# Patient Record
Sex: MALE | Race: WHITE | Employment: FULL TIME
[De-identification: names, ages, dates, MRNs, and addresses within clinical notes are randomized per-mention and may not be internally consistent; named-entity substitution may affect disease eponyms.]

---

## 2017-09-12 ENCOUNTER — HOSPITAL ENCOUNTER (OUTPATIENT)
Dept: MRI IMAGING | Facility: CLINIC | Age: 57
Discharge: HOME | End: 2017-09-12
Payer: COMMERCIAL

## 2017-09-12 DIAGNOSIS — G89.29 CHRONIC MIDLINE LOW BACK PAIN WITH LEFT-SIDED SCIATICA: ICD-10-CM

## 2017-09-12 DIAGNOSIS — M54.42 CHRONIC MIDLINE LOW BACK PAIN WITH LEFT-SIDED SCIATICA: ICD-10-CM

## 2017-09-12 PROCEDURE — 72148 MRI LUMBAR SPINE W/O DYE: CPT

## 2017-09-25 ENCOUNTER — INITIAL CONSULT (OUTPATIENT)
Dept: NEUROSURGERY | Age: 57
End: 2017-09-25
Payer: COMMERCIAL

## 2017-09-25 ENCOUNTER — HOSPITAL ENCOUNTER (OUTPATIENT)
Dept: GENERAL RADIOLOGY | Facility: CLINIC | Age: 57
Discharge: HOME | End: 2017-09-25
Payer: COMMERCIAL

## 2017-09-25 ENCOUNTER — HOSPITAL ENCOUNTER (OUTPATIENT)
Facility: CLINIC | Age: 57
Discharge: HOME | End: 2017-09-25
Payer: COMMERCIAL

## 2017-09-25 VITALS
SYSTOLIC BLOOD PRESSURE: 145 MMHG | DIASTOLIC BLOOD PRESSURE: 70 MMHG | BODY MASS INDEX: 31.15 KG/M2 | HEIGHT: 72 IN | WEIGHT: 230 LBS | HEART RATE: 69 BPM

## 2017-09-25 DIAGNOSIS — G89.29 CHRONIC MIDLINE LOW BACK PAIN WITH LEFT-SIDED SCIATICA: ICD-10-CM

## 2017-09-25 DIAGNOSIS — M54.42 CHRONIC MIDLINE LOW BACK PAIN WITH LEFT-SIDED SCIATICA: ICD-10-CM

## 2017-09-25 DIAGNOSIS — G89.29 CHRONIC MIDLINE LOW BACK PAIN WITH LEFT-SIDED SCIATICA: Primary | ICD-10-CM

## 2017-09-25 DIAGNOSIS — M54.42 CHRONIC MIDLINE LOW BACK PAIN WITH LEFT-SIDED SCIATICA: Primary | ICD-10-CM

## 2017-09-25 PROCEDURE — 1036F TOBACCO NON-USER: CPT | Performed by: NEUROLOGICAL SURGERY

## 2017-09-25 PROCEDURE — 72100 X-RAY EXAM L-S SPINE 2/3 VWS: CPT

## 2017-09-25 PROCEDURE — G8428 CUR MEDS NOT DOCUMENT: HCPCS | Performed by: NEUROLOGICAL SURGERY

## 2017-09-25 PROCEDURE — 3017F COLORECTAL CA SCREEN DOC REV: CPT | Performed by: NEUROLOGICAL SURGERY

## 2017-09-25 PROCEDURE — 99243 OFF/OP CNSLTJ NEW/EST LOW 30: CPT | Performed by: NEUROLOGICAL SURGERY

## 2017-09-25 PROCEDURE — G8419 CALC BMI OUT NRM PARAM NOF/U: HCPCS | Performed by: NEUROLOGICAL SURGERY

## 2017-09-26 ASSESSMENT — VISUAL ACUITY: VA_NORMAL: 1

## 2017-10-10 ENCOUNTER — HOSPITAL ENCOUNTER (OUTPATIENT)
Dept: GENERAL RADIOLOGY | Age: 57
Discharge: HOME | End: 2017-10-10
Payer: COMMERCIAL

## 2017-10-10 ENCOUNTER — HOSPITAL ENCOUNTER (OUTPATIENT)
Dept: PREADMISSION TESTING | Age: 57
Discharge: HOME | End: 2017-10-10
Payer: COMMERCIAL

## 2017-10-10 VITALS
BODY MASS INDEX: 30.58 KG/M2 | TEMPERATURE: 97.3 F | DIASTOLIC BLOOD PRESSURE: 93 MMHG | SYSTOLIC BLOOD PRESSURE: 160 MMHG | HEIGHT: 72 IN | WEIGHT: 225.75 LBS | OXYGEN SATURATION: 98 % | HEART RATE: 68 BPM | RESPIRATION RATE: 16 BRPM

## 2017-10-10 LAB
ABSOLUTE EOS #: 0.1 K/UL (ref 0–0.4)
ABSOLUTE LYMPH #: 1.5 K/UL (ref 1–4.8)
ABSOLUTE MONO #: 0.7 K/UL (ref 0.1–1.2)
ANION GAP SERPL CALCULATED.3IONS-SCNC: 14 MMOL/L (ref 9–17)
BASOPHILS # BLD: 0 %
BASOPHILS ABSOLUTE: 0 K/UL (ref 0–0.2)
BILIRUBIN URINE: NEGATIVE
BUN BLDV-MCNC: 11 MG/DL (ref 6–20)
BUN/CREAT BLD: ABNORMAL (ref 9–20)
CALCIUM SERPL-MCNC: 9.6 MG/DL (ref 8.6–10.4)
CHLORIDE BLD-SCNC: 99 MMOL/L (ref 98–107)
CO2: 25 MMOL/L (ref 20–31)
COLOR: YELLOW
COMMENT UA: NORMAL
CREAT SERPL-MCNC: 0.73 MG/DL (ref 0.7–1.2)
DIFFERENTIAL TYPE: NORMAL
EKG ATRIAL RATE: 66 BPM
EKG P AXIS: 40 DEGREES
EKG P-R INTERVAL: 146 MS
EKG Q-T INTERVAL: 402 MS
EKG QRS DURATION: 90 MS
EKG QTC CALCULATION (BAZETT): 421 MS
EKG R AXIS: 11 DEGREES
EKG T AXIS: 17 DEGREES
EKG VENTRICULAR RATE: 66 BPM
EOSINOPHILS RELATIVE PERCENT: 1 %
GFR AFRICAN AMERICAN: >60 ML/MIN
GFR NON-AFRICAN AMERICAN: >60 ML/MIN
GFR SERPL CREATININE-BSD FRML MDRD: ABNORMAL ML/MIN/{1.73_M2}
GFR SERPL CREATININE-BSD FRML MDRD: ABNORMAL ML/MIN/{1.73_M2}
GLUCOSE BLD-MCNC: 108 MG/DL (ref 70–99)
GLUCOSE URINE: NEGATIVE
HCT VFR BLD CALC: 50.2 % (ref 41–53)
HEMOGLOBIN: 17.3 G/DL (ref 13.5–17.5)
INR BLD: 0.9
KETONES, URINE: NEGATIVE
LEUKOCYTE ESTERASE, URINE: NEGATIVE
LYMPHOCYTES # BLD: 21 %
MCH RBC QN AUTO: 31.2 PG (ref 26–34)
MCHC RBC AUTO-ENTMCNC: 34.4 G/DL (ref 31–37)
MCV RBC AUTO: 90.6 FL (ref 80–100)
MONOCYTES # BLD: 10 %
NITRITE, URINE: NEGATIVE
PARTIAL THROMBOPLASTIN TIME: 22.7 SEC (ref 21.3–31.3)
PDW BLD-RTO: 12.8 % (ref 12.5–15.4)
PH UA: 5 (ref 5–8)
PLATELET # BLD: 208 K/UL (ref 140–450)
PLATELET ESTIMATE: NORMAL
PMV BLD AUTO: 9 FL (ref 6–12)
POTASSIUM SERPL-SCNC: 5.4 MMOL/L (ref 3.7–5.3)
PROTEIN UA: NEGATIVE
PROTHROMBIN TIME: 10.1 SEC (ref 9.4–12.6)
RBC # BLD: 5.54 M/UL (ref 4.5–5.9)
RBC # BLD: NORMAL 10*6/UL
SEG NEUTROPHILS: 68 %
SEGMENTED NEUTROPHILS ABSOLUTE COUNT: 4.7 K/UL (ref 1.8–7.7)
SODIUM BLD-SCNC: 138 MMOL/L (ref 135–144)
SPECIFIC GRAVITY UA: 1.01 (ref 1–1.03)
TURBIDITY: CLEAR
URINE HGB: NEGATIVE
UROBILINOGEN, URINE: NORMAL
WBC # BLD: 7 K/UL (ref 3.5–11)
WBC # BLD: NORMAL 10*3/UL

## 2017-10-10 PROCEDURE — 71020 XR CHEST STANDARD TWO VW: CPT

## 2017-10-10 PROCEDURE — 80048 BASIC METABOLIC PNL TOTAL CA: CPT

## 2017-10-10 PROCEDURE — 85610 PROTHROMBIN TIME: CPT

## 2017-10-10 PROCEDURE — 85025 COMPLETE CBC W/AUTO DIFF WBC: CPT

## 2017-10-10 PROCEDURE — 93005 ELECTROCARDIOGRAM TRACING: CPT

## 2017-10-10 PROCEDURE — 85730 THROMBOPLASTIN TIME PARTIAL: CPT

## 2017-10-10 PROCEDURE — 81003 URINALYSIS AUTO W/O SCOPE: CPT

## 2017-10-10 PROCEDURE — 36415 COLL VENOUS BLD VENIPUNCTURE: CPT

## 2017-10-10 RX ORDER — SODIUM CHLORIDE, SODIUM LACTATE, POTASSIUM CHLORIDE, CALCIUM CHLORIDE 600; 310; 30; 20 MG/100ML; MG/100ML; MG/100ML; MG/100ML
1000 INJECTION, SOLUTION INTRAVENOUS CONTINUOUS
Status: CANCELLED | OUTPATIENT
Start: 2017-10-10

## 2017-10-10 NOTE — H&P
History and Physical    Pt Name: Srinivasan Sequeira  MRN: [id number]  YOB: 1960  Date of evaluation: 10/10/2017    SUBJECTIVE:   History of Chief Complaint:    Patient complains of back pain, LLE pain/weakness. He says that he has had symptoms for approximately 1 year now, but that it has gotten progressively worse. He had chiropractor treatment, PT. He has been found to have lumbar disc disease, scheduled for lumbar laminectomy. Past Medical History    has a past medical history of Lumbar disc disease and Snores. Past Surgical History   has a past surgical history that includes Rotator cuff repair (Right). Medications  Aleve, tramadol  Allergies  has No Known Allergies. Family History  family history includes Dementia in his mother; Heart Failure in his father. Social History   reports that he has quit smoking. He has never used smokeless tobacco.   reports that he does not drink alcohol. reports that he does not use drugs. Marital Status   Occupation [employer] [employer] employee    OBJECTIVE:   VITALS:  height is 6' (1.829 m) and weight is 225 lb 12 oz (102.4 kg). His oral temperature is 97.3 °F (36.3 °C). His blood pressure is 160/93 (abnormal) and his pulse is 68. His respiration is 16 and oxygen saturation is 98%. CONSTITUTIONAL:alert & oriented x 3, no acute distress   SKIN:  Warm and dry, no rashes   HEAD:  Normocephalic, atraumatic   EYES: PERRL. EOMs intact. EARS:  Hearing grossly WNL. NOSE:  Nares patent. No rhinorrhea   MOUTH/THROAT:  benign  NECK:supple, no lymphadenopathy  LUNGS: Clear to auscultation bilaterally, no wheezes, rales, or rhonchi. CARDIOVASCULAR: Heart sounds are normal.  Regular rate and rhythm without murmur, gallop or rub. ABDOMEN: soft, non tender, non distended, no masses or organomegaly   EXTREMITIES: no edema bilateral lower extremities     Testing:   EKG: 10/10/17  Labs pending: drawn 10/10/2017   Chest XRay:  10/10/17    IMPRESSIONS:   1.  Lumbar

## 2017-10-24 ENCOUNTER — HOSPITAL ENCOUNTER (INPATIENT)
Age: 57
LOS: 4 days | Discharge: HOME | DRG: 520 | End: 2017-10-28
Attending: NEUROLOGICAL SURGERY | Admitting: NEUROLOGICAL SURGERY
Payer: COMMERCIAL

## 2017-10-24 ENCOUNTER — ANESTHESIA EVENT (OUTPATIENT)
Dept: OPERATING ROOM | Age: 57
DRG: 520 | End: 2017-10-24
Payer: COMMERCIAL

## 2017-10-24 ENCOUNTER — ANESTHESIA (OUTPATIENT)
Dept: OPERATING ROOM | Age: 57
DRG: 520 | End: 2017-10-24
Payer: COMMERCIAL

## 2017-10-24 ENCOUNTER — APPOINTMENT (OUTPATIENT)
Dept: GENERAL RADIOLOGY | Age: 57
DRG: 520 | End: 2017-10-24
Attending: NEUROLOGICAL SURGERY
Payer: COMMERCIAL

## 2017-10-24 VITALS — SYSTOLIC BLOOD PRESSURE: 107 MMHG | TEMPERATURE: 98.2 F | DIASTOLIC BLOOD PRESSURE: 65 MMHG | OXYGEN SATURATION: 98 %

## 2017-10-24 LAB
ALLEN TEST: ABNORMAL
CALCIUM IONIZED: 1.19 MMOL/L (ref 1.13–1.33)
CARBOXYHEMOGLOBIN: 1.3 % (ref 0–5)
CHLORIDE, WHOLE BLOOD: 108 MMOL/L (ref 98–110)
FIO2: 50
GLUCOSE BLD-MCNC: 153 MG/DL (ref 75–110)
HCO3 ARTERIAL: 23.8 MMOL/L (ref 22–27)
HCT VFR BLD CALC: 41.4 %
HEMOGLOBIN: 13.5 GM/DL
METHEMOGLOBIN: ABNORMAL % (ref 0–1.5)
MODE: ABNORMAL
NEGATIVE BASE EXCESS, ART: 0.4 MMOL/L (ref 0–2)
NOTIFICATION TIME: ABNORMAL
NOTIFICATION: ABNORMAL
O2 DEVICE/FLOW/%: ABNORMAL
O2 SAT, ARTERIAL: 100 % (ref 94–100)
OXYHEMOGLOBIN: ABNORMAL % (ref 95–98)
PATIENT TEMP: 37
PCO2 ARTERIAL: 39.9 MMHG (ref 32–45)
PCO2, ART, TEMP ADJ: ABNORMAL (ref 32–45)
PEEP/CPAP: ABNORMAL
PH ARTERIAL: 7.39 (ref 7.35–7.45)
PH, ART, TEMP ADJ: ABNORMAL (ref 7.35–7.45)
PO2 ARTERIAL: 232 MMHG (ref 75–95)
PO2, ART, TEMP ADJ: ABNORMAL MMHG (ref 75–95)
POC POTASSIUM: 4 MMOL/L (ref 3.5–4.5)
POSITIVE BASE EXCESS, ART: ABNORMAL MMOL/L (ref 0–2)
POTASSIUM, WHOLE BLOOD: 4.2 MMOL/L (ref 3.6–5)
PSV: ABNORMAL
PT. POSITION: ABNORMAL
RESPIRATORY RATE: ABNORMAL
SAMPLE SITE: ABNORMAL
SET RATE: ABNORMAL
SODIUM, WHOLE BLOOD: 137 MMOL/L (ref 136–145)
TEXT FOR RESPIRATORY: ABNORMAL
TOTAL HB: ABNORMAL G/DL (ref 12–16)
TOTAL RATE: ABNORMAL
VT: ABNORMAL

## 2017-10-24 PROCEDURE — 82805 BLOOD GASES W/O2 SATURATION: CPT

## 2017-10-24 PROCEDURE — 6360000002 HC RX W HCPCS

## 2017-10-24 PROCEDURE — 2580000003 HC RX 258: Performed by: NEUROLOGICAL SURGERY

## 2017-10-24 PROCEDURE — 85018 HEMOGLOBIN: CPT

## 2017-10-24 PROCEDURE — 6360000002 HC RX W HCPCS: Performed by: NEUROLOGICAL SURGERY

## 2017-10-24 PROCEDURE — 2500000003 HC RX 250 WO HCPCS: Performed by: NEUROLOGICAL SURGERY

## 2017-10-24 PROCEDURE — 2580000003 HC RX 258: Performed by: ANESTHESIOLOGY

## 2017-10-24 PROCEDURE — 6370000000 HC RX 637 (ALT 250 FOR IP): Performed by: NEUROLOGICAL SURGERY

## 2017-10-24 PROCEDURE — 82330 ASSAY OF CALCIUM: CPT

## 2017-10-24 PROCEDURE — 85014 HEMATOCRIT: CPT

## 2017-10-24 PROCEDURE — 2060000000 HC ICU INTERMEDIATE R&B

## 2017-10-24 PROCEDURE — 84132 ASSAY OF SERUM POTASSIUM: CPT

## 2017-10-24 PROCEDURE — 3700000001 HC ADD 15 MINUTES (ANESTHESIA): Performed by: NEUROLOGICAL SURGERY

## 2017-10-24 PROCEDURE — A6402 STERILE GAUZE <= 16 SQ IN: HCPCS | Performed by: NEUROLOGICAL SURGERY

## 2017-10-24 PROCEDURE — 7100000000 HC PACU RECOVERY - FIRST 15 MIN: Performed by: NEUROLOGICAL SURGERY

## 2017-10-24 PROCEDURE — 7100000001 HC PACU RECOVERY - ADDTL 15 MIN: Performed by: NEUROLOGICAL SURGERY

## 2017-10-24 PROCEDURE — 00NY0ZZ RELEASE LUMBAR SPINAL CORD, OPEN APPROACH: ICD-10-PCS | Performed by: NEUROLOGICAL SURGERY

## 2017-10-24 PROCEDURE — 3600000014 HC SURGERY LEVEL 4 ADDTL 15MIN: Performed by: NEUROLOGICAL SURGERY

## 2017-10-24 PROCEDURE — C1729 CATH, DRAINAGE: HCPCS | Performed by: NEUROLOGICAL SURGERY

## 2017-10-24 PROCEDURE — 6360000002 HC RX W HCPCS: Performed by: ANESTHESIOLOGY

## 2017-10-24 PROCEDURE — 3600000004 HC SURGERY LEVEL 4 BASE: Performed by: NEUROLOGICAL SURGERY

## 2017-10-24 PROCEDURE — 87086 URINE CULTURE/COLONY COUNT: CPT

## 2017-10-24 PROCEDURE — 2500000003 HC RX 250 WO HCPCS: Performed by: SPECIALIST

## 2017-10-24 PROCEDURE — 63047 LAM FACETEC & FORAMOT LUMBAR: CPT | Performed by: NEUROLOGICAL SURGERY

## 2017-10-24 PROCEDURE — 2580000003 HC RX 258: Performed by: EMERGENCY MEDICINE

## 2017-10-24 PROCEDURE — 72020 X-RAY EXAM OF SPINE 1 VIEW: CPT

## 2017-10-24 PROCEDURE — 63048 LAM FACETEC &FORAMOT EA ADDL: CPT | Performed by: NEUROLOGICAL SURGERY

## 2017-10-24 PROCEDURE — 3700000000 HC ANESTHESIA ATTENDED CARE: Performed by: NEUROLOGICAL SURGERY

## 2017-10-24 PROCEDURE — 01NB0ZZ RELEASE LUMBAR NERVE, OPEN APPROACH: ICD-10-PCS | Performed by: NEUROLOGICAL SURGERY

## 2017-10-24 PROCEDURE — 2580000003 HC RX 258: Performed by: SPECIALIST

## 2017-10-24 PROCEDURE — 6360000002 HC RX W HCPCS: Performed by: SPECIALIST

## 2017-10-24 PROCEDURE — 2720000010 HC SURG SUPPLY STERILE: Performed by: NEUROLOGICAL SURGERY

## 2017-10-24 RX ORDER — SODIUM CHLORIDE 9 MG/ML
INJECTION, SOLUTION INTRAVENOUS CONTINUOUS
Status: DISCONTINUED | OUTPATIENT
Start: 2017-10-24 | End: 2017-10-26

## 2017-10-24 RX ORDER — HYDRALAZINE HYDROCHLORIDE 20 MG/ML
10 INJECTION INTRAMUSCULAR; INTRAVENOUS
Status: DISCONTINUED | OUTPATIENT
Start: 2017-10-24 | End: 2017-10-24

## 2017-10-24 RX ORDER — GINSENG 100 MG
CAPSULE ORAL PRN
Status: DISCONTINUED | OUTPATIENT
Start: 2017-10-24 | End: 2017-10-24

## 2017-10-24 RX ORDER — LORAZEPAM 2 MG/ML
4 INJECTION INTRAMUSCULAR
Status: DISCONTINUED | OUTPATIENT
Start: 2017-10-24 | End: 2017-10-28 | Stop reason: HOSPADM

## 2017-10-24 RX ORDER — SODIUM CHLORIDE 0.9 % (FLUSH) 0.9 %
10 SYRINGE (ML) INJECTION EVERY 12 HOURS SCHEDULED
Status: DISCONTINUED | OUTPATIENT
Start: 2017-10-24 | End: 2017-10-28 | Stop reason: HOSPADM

## 2017-10-24 RX ORDER — NALOXONE HYDROCHLORIDE 0.4 MG/ML
INJECTION, SOLUTION INTRAMUSCULAR; INTRAVENOUS; SUBCUTANEOUS PRN
Status: DISCONTINUED | OUTPATIENT
Start: 2017-10-24 | End: 2017-10-24 | Stop reason: SDUPTHER

## 2017-10-24 RX ORDER — HYDRALAZINE HYDROCHLORIDE 20 MG/ML
10 INJECTION INTRAMUSCULAR; INTRAVENOUS EVERY 6 HOURS PRN
Status: DISCONTINUED | OUTPATIENT
Start: 2017-10-24 | End: 2017-10-28 | Stop reason: HOSPADM

## 2017-10-24 RX ORDER — ROCURONIUM BROMIDE 10 MG/ML
INJECTION, SOLUTION INTRAVENOUS PRN
Status: DISCONTINUED | OUTPATIENT
Start: 2017-10-24 | End: 2017-10-24 | Stop reason: SDUPTHER

## 2017-10-24 RX ORDER — LORAZEPAM 2 MG/1
4 TABLET ORAL
Status: DISCONTINUED | OUTPATIENT
Start: 2017-10-24 | End: 2017-10-28 | Stop reason: HOSPADM

## 2017-10-24 RX ORDER — LORAZEPAM 2 MG/ML
2 INJECTION INTRAMUSCULAR
Status: DISCONTINUED | OUTPATIENT
Start: 2017-10-24 | End: 2017-10-28 | Stop reason: HOSPADM

## 2017-10-24 RX ORDER — SODIUM CHLORIDE, SODIUM LACTATE, POTASSIUM CHLORIDE, CALCIUM CHLORIDE 600; 310; 30; 20 MG/100ML; MG/100ML; MG/100ML; MG/100ML
INJECTION, SOLUTION INTRAVENOUS CONTINUOUS PRN
Status: DISCONTINUED | OUTPATIENT
Start: 2017-10-24 | End: 2017-10-24 | Stop reason: SDUPTHER

## 2017-10-24 RX ORDER — LORAZEPAM 2 MG/1
2 TABLET ORAL
Status: DISCONTINUED | OUTPATIENT
Start: 2017-10-24 | End: 2017-10-28 | Stop reason: HOSPADM

## 2017-10-24 RX ORDER — LORAZEPAM 2 MG/ML
1 INJECTION INTRAMUSCULAR
Status: DISCONTINUED | OUTPATIENT
Start: 2017-10-24 | End: 2017-10-28 | Stop reason: HOSPADM

## 2017-10-24 RX ORDER — CYCLOBENZAPRINE HCL 10 MG
10 TABLET ORAL 3 TIMES DAILY
Status: DISCONTINUED | OUTPATIENT
Start: 2017-10-24 | End: 2017-10-28 | Stop reason: HOSPADM

## 2017-10-24 RX ORDER — MIDAZOLAM HYDROCHLORIDE 1 MG/ML
INJECTION INTRAMUSCULAR; INTRAVENOUS PRN
Status: DISCONTINUED | OUTPATIENT
Start: 2017-10-24 | End: 2017-10-24 | Stop reason: SDUPTHER

## 2017-10-24 RX ORDER — OXYCODONE HYDROCHLORIDE AND ACETAMINOPHEN 5; 325 MG/1; MG/1
2 TABLET ORAL EVERY 4 HOURS PRN
Status: DISCONTINUED | OUTPATIENT
Start: 2017-10-24 | End: 2017-10-28 | Stop reason: HOSPADM

## 2017-10-24 RX ORDER — SODIUM CHLORIDE 0.9 % (FLUSH) 0.9 %
10 SYRINGE (ML) INJECTION PRN
Status: DISCONTINUED | OUTPATIENT
Start: 2017-10-24 | End: 2017-10-28 | Stop reason: HOSPADM

## 2017-10-24 RX ORDER — LORAZEPAM 1 MG/1
1 TABLET ORAL
Status: DISCONTINUED | OUTPATIENT
Start: 2017-10-24 | End: 2017-10-28 | Stop reason: HOSPADM

## 2017-10-24 RX ORDER — OXYCODONE HYDROCHLORIDE AND ACETAMINOPHEN 5; 325 MG/1; MG/1
1 TABLET ORAL EVERY 4 HOURS PRN
Status: DISCONTINUED | OUTPATIENT
Start: 2017-10-24 | End: 2017-10-28 | Stop reason: HOSPADM

## 2017-10-24 RX ORDER — SODIUM CHLORIDE, SODIUM LACTATE, POTASSIUM CHLORIDE, CALCIUM CHLORIDE 600; 310; 30; 20 MG/100ML; MG/100ML; MG/100ML; MG/100ML
1000 INJECTION, SOLUTION INTRAVENOUS CONTINUOUS
Status: DISCONTINUED | OUTPATIENT
Start: 2017-10-24 | End: 2017-10-24

## 2017-10-24 RX ORDER — PROPOFOL 10 MG/ML
INJECTION, EMULSION INTRAVENOUS CONTINUOUS PRN
Status: DISCONTINUED | OUTPATIENT
Start: 2017-10-24 | End: 2017-10-24 | Stop reason: SDUPTHER

## 2017-10-24 RX ORDER — DOCUSATE SODIUM 100 MG/1
100 CAPSULE, LIQUID FILLED ORAL 2 TIMES DAILY
Status: DISCONTINUED | OUTPATIENT
Start: 2017-10-24 | End: 2017-10-28 | Stop reason: HOSPADM

## 2017-10-24 RX ORDER — WOUND DRESSING ADHESIVE - LIQUID
LIQUID MISCELLANEOUS PRN
Status: DISCONTINUED | OUTPATIENT
Start: 2017-10-24 | End: 2017-10-24

## 2017-10-24 RX ORDER — GLYCOPYRROLATE 0.2 MG/ML
INJECTION INTRAMUSCULAR; INTRAVENOUS PRN
Status: DISCONTINUED | OUTPATIENT
Start: 2017-10-24 | End: 2017-10-24 | Stop reason: SDUPTHER

## 2017-10-24 RX ORDER — PROPOFOL 10 MG/ML
INJECTION, EMULSION INTRAVENOUS PRN
Status: DISCONTINUED | OUTPATIENT
Start: 2017-10-24 | End: 2017-10-24 | Stop reason: SDUPTHER

## 2017-10-24 RX ORDER — ONDANSETRON 2 MG/ML
4 INJECTION INTRAMUSCULAR; INTRAVENOUS EVERY 6 HOURS PRN
Status: DISCONTINUED | OUTPATIENT
Start: 2017-10-24 | End: 2017-10-28 | Stop reason: HOSPADM

## 2017-10-24 RX ORDER — MIDAZOLAM HYDROCHLORIDE 1 MG/ML
1 INJECTION INTRAMUSCULAR; INTRAVENOUS EVERY 10 MIN PRN
Status: COMPLETED | OUTPATIENT
Start: 2017-10-24 | End: 2017-10-24

## 2017-10-24 RX ORDER — MAGNESIUM HYDROXIDE 1200 MG/15ML
LIQUID ORAL CONTINUOUS PRN
Status: DISCONTINUED | OUTPATIENT
Start: 2017-10-24 | End: 2017-10-24

## 2017-10-24 RX ORDER — ONDANSETRON 2 MG/ML
INJECTION INTRAMUSCULAR; INTRAVENOUS PRN
Status: DISCONTINUED | OUTPATIENT
Start: 2017-10-24 | End: 2017-10-24 | Stop reason: SDUPTHER

## 2017-10-24 RX ORDER — ACETAMINOPHEN 325 MG/1
650 TABLET ORAL EVERY 4 HOURS PRN
Status: DISCONTINUED | OUTPATIENT
Start: 2017-10-24 | End: 2017-10-28 | Stop reason: HOSPADM

## 2017-10-24 RX ORDER — LORAZEPAM 2 MG/ML
3 INJECTION INTRAMUSCULAR
Status: DISCONTINUED | OUTPATIENT
Start: 2017-10-24 | End: 2017-10-28 | Stop reason: HOSPADM

## 2017-10-24 RX ORDER — LIDOCAINE HYDROCHLORIDE 10 MG/ML
INJECTION, SOLUTION EPIDURAL; INFILTRATION; INTRACAUDAL; PERINEURAL PRN
Status: DISCONTINUED | OUTPATIENT
Start: 2017-10-24 | End: 2017-10-24 | Stop reason: SDUPTHER

## 2017-10-24 RX ORDER — DIPHENHYDRAMINE HYDROCHLORIDE 50 MG/ML
12.5 INJECTION INTRAMUSCULAR; INTRAVENOUS
Status: DISCONTINUED | OUTPATIENT
Start: 2017-10-24 | End: 2017-10-24

## 2017-10-24 RX ORDER — FENTANYL CITRATE 50 UG/ML
INJECTION, SOLUTION INTRAMUSCULAR; INTRAVENOUS PRN
Status: DISCONTINUED | OUTPATIENT
Start: 2017-10-24 | End: 2017-10-24 | Stop reason: SDUPTHER

## 2017-10-24 RX ORDER — FAMOTIDINE 20 MG/1
20 TABLET, FILM COATED ORAL 2 TIMES DAILY
Status: DISCONTINUED | OUTPATIENT
Start: 2017-10-24 | End: 2017-10-28 | Stop reason: HOSPADM

## 2017-10-24 RX ADMIN — SODIUM CHLORIDE, POTASSIUM CHLORIDE, SODIUM LACTATE AND CALCIUM CHLORIDE 1000 ML: 600; 310; 30; 20 INJECTION, SOLUTION INTRAVENOUS at 06:45

## 2017-10-24 RX ADMIN — PHENYLEPHRINE HYDROCHLORIDE 20 MCG/MIN: 10 INJECTION INTRAMUSCULAR; INTRAVENOUS; SUBCUTANEOUS at 09:05

## 2017-10-24 RX ADMIN — HYDROMORPHONE HYDROCHLORIDE 0.5 MG: 1 INJECTION, SOLUTION INTRAMUSCULAR; INTRAVENOUS; SUBCUTANEOUS at 13:45

## 2017-10-24 RX ADMIN — NEOSTIGMINE METHYLSULFATE 3 MG: 1 INJECTION, SOLUTION INTRAMUSCULAR; INTRAVENOUS; SUBCUTANEOUS at 12:48

## 2017-10-24 RX ADMIN — Medication 10 ML: at 22:07

## 2017-10-24 RX ADMIN — MIDAZOLAM HYDROCHLORIDE 1 MG: 1 INJECTION, SOLUTION INTRAMUSCULAR; INTRAVENOUS at 13:50

## 2017-10-24 RX ADMIN — HYDROMORPHONE HYDROCHLORIDE 0.5 MG: 1 INJECTION, SOLUTION INTRAMUSCULAR; INTRAVENOUS; SUBCUTANEOUS at 14:55

## 2017-10-24 RX ADMIN — PROPOFOL 200 MG: 10 INJECTION, EMULSION INTRAVENOUS at 07:35

## 2017-10-24 RX ADMIN — HYDROMORPHONE HYDROCHLORIDE 0.25 MG: 1 INJECTION, SOLUTION INTRAMUSCULAR; INTRAVENOUS; SUBCUTANEOUS at 14:45

## 2017-10-24 RX ADMIN — FAMOTIDINE 20 MG: 20 TABLET, FILM COATED ORAL at 20:56

## 2017-10-24 RX ADMIN — MIDAZOLAM HYDROCHLORIDE 1 MG: 1 INJECTION, SOLUTION INTRAMUSCULAR; INTRAVENOUS at 15:12

## 2017-10-24 RX ADMIN — CYCLOBENZAPRINE 10 MG: 10 TABLET, FILM COATED ORAL at 17:04

## 2017-10-24 RX ADMIN — MIDAZOLAM HYDROCHLORIDE 2 MG: 1 INJECTION, SOLUTION INTRAMUSCULAR; INTRAVENOUS at 07:31

## 2017-10-24 RX ADMIN — CYCLOBENZAPRINE 10 MG: 10 TABLET, FILM COATED ORAL at 20:56

## 2017-10-24 RX ADMIN — PROPOFOL 50 MCG/KG/MIN: 10 INJECTION, EMULSION INTRAVENOUS at 08:02

## 2017-10-24 RX ADMIN — HYDROMORPHONE HYDROCHLORIDE 0.5 MG: 1 INJECTION, SOLUTION INTRAMUSCULAR; INTRAVENOUS; SUBCUTANEOUS at 16:10

## 2017-10-24 RX ADMIN — LIDOCAINE HYDROCHLORIDE 50 MG: 10 INJECTION, SOLUTION EPIDURAL; INFILTRATION; INTRACAUDAL; PERINEURAL at 07:35

## 2017-10-24 RX ADMIN — NALOXONE HYDROCHLORIDE 0.08 MG: 0.4 INJECTION, SOLUTION INTRAMUSCULAR; INTRAVENOUS; SUBCUTANEOUS at 12:44

## 2017-10-24 RX ADMIN — SUFENTANIL CITRATE 0.5 MCG/KG/HR: 50 INJECTION EPIDURAL; INTRAVENOUS at 08:00

## 2017-10-24 RX ADMIN — PHENYLEPHRINE HYDROCHLORIDE 100 MCG: 10 INJECTION INTRAMUSCULAR; INTRAVENOUS; SUBCUTANEOUS at 10:25

## 2017-10-24 RX ADMIN — HYDROMORPHONE HYDROCHLORIDE 0.25 MG: 1 INJECTION, SOLUTION INTRAMUSCULAR; INTRAVENOUS; SUBCUTANEOUS at 14:50

## 2017-10-24 RX ADMIN — SODIUM CHLORIDE: 9 INJECTION, SOLUTION INTRAVENOUS at 16:29

## 2017-10-24 RX ADMIN — GLYCOPYRROLATE 0.4 MG: 0.2 INJECTION INTRAMUSCULAR; INTRAVENOUS at 12:48

## 2017-10-24 RX ADMIN — OXYCODONE HYDROCHLORIDE AND ACETAMINOPHEN 2 TABLET: 5; 325 TABLET ORAL at 19:08

## 2017-10-24 RX ADMIN — SODIUM CHLORIDE, POTASSIUM CHLORIDE, SODIUM LACTATE AND CALCIUM CHLORIDE: 600; 310; 30; 20 INJECTION, SOLUTION INTRAVENOUS at 07:30

## 2017-10-24 RX ADMIN — FENTANYL CITRATE 100 MCG: 50 INJECTION, SOLUTION INTRAMUSCULAR; INTRAVENOUS at 07:35

## 2017-10-24 RX ADMIN — CEFAZOLIN 2 G: 1 INJECTION, POWDER, FOR SOLUTION INTRAMUSCULAR; INTRAVENOUS at 17:01

## 2017-10-24 RX ADMIN — HYDROMORPHONE HYDROCHLORIDE 0.5 MG: 1 INJECTION, SOLUTION INTRAMUSCULAR; INTRAVENOUS; SUBCUTANEOUS at 20:11

## 2017-10-24 RX ADMIN — ROCURONIUM BROMIDE 50 MG: 10 INJECTION, SOLUTION INTRAVENOUS at 07:35

## 2017-10-24 RX ADMIN — DOCUSATE SODIUM 100 MG: 100 CAPSULE ORAL at 20:56

## 2017-10-24 RX ADMIN — ONDANSETRON 4 MG: 2 INJECTION, SOLUTION INTRAMUSCULAR; INTRAVENOUS at 11:54

## 2017-10-24 ASSESSMENT — PAIN DESCRIPTION - PAIN TYPE
TYPE: SURGICAL PAIN

## 2017-10-24 ASSESSMENT — PAIN DESCRIPTION - PROGRESSION: CLINICAL_PROGRESSION: NOT CHANGED

## 2017-10-24 ASSESSMENT — PAIN SCALES - GENERAL
PAINLEVEL_OUTOF10: 7
PAINLEVEL_OUTOF10: 8
PAINLEVEL_OUTOF10: 4
PAINLEVEL_OUTOF10: 9
PAINLEVEL_OUTOF10: 3
PAINLEVEL_OUTOF10: 6
PAINLEVEL_OUTOF10: 7
PAINLEVEL_OUTOF10: 3
PAINLEVEL_OUTOF10: 3
PAINLEVEL_OUTOF10: 6
PAINLEVEL_OUTOF10: 7

## 2017-10-24 ASSESSMENT — PAIN DESCRIPTION - FREQUENCY: FREQUENCY: CONTINUOUS

## 2017-10-24 ASSESSMENT — PAIN - FUNCTIONAL ASSESSMENT: PAIN_FUNCTIONAL_ASSESSMENT: 0-10

## 2017-10-24 ASSESSMENT — PAIN DESCRIPTION - ORIENTATION: ORIENTATION: RIGHT;LEFT

## 2017-10-24 ASSESSMENT — PAIN DESCRIPTION - DESCRIPTORS: DESCRIPTORS: ACHING;DISCOMFORT

## 2017-10-24 ASSESSMENT — PAIN DESCRIPTION - ONSET: ONSET: ON-GOING

## 2017-10-24 ASSESSMENT — PAIN DESCRIPTION - LOCATION
LOCATION: BACK
LOCATION: BACK

## 2017-10-24 ASSESSMENT — LIFESTYLE VARIABLES: SMOKING_STATUS: 0

## 2017-10-24 NOTE — H&P (VIEW-ONLY)
History and Physical    Pt Name: Lesley Ledezma  MRN: [id number]  YOB: 1960  Date of evaluation: 10/10/2017    SUBJECTIVE:   History of Chief Complaint:    Patient complains of back pain, LLE pain/weakness. He says that he has had symptoms for approximately 1 year now, but that it has gotten progressively worse. He had chiropractor treatment, PT. He has been found to have lumbar disc disease, scheduled for lumbar laminectomy. Past Medical History    has a past medical history of Lumbar disc disease and Snores. Past Surgical History   has a past surgical history that includes Rotator cuff repair (Right). Medications  Aleve, tramadol  Allergies  has No Known Allergies. Family History  family history includes Dementia in his mother; Heart Failure in his father. Social History   reports that he has quit smoking. He has never used smokeless tobacco.   reports that he does not drink alcohol. reports that he does not use drugs. Marital Status   Occupation [employer] employee    OBJECTIVE:   VITALS:  height is 6' (1.829 m) and weight is 225 lb 12 oz (102.4 kg). His oral temperature is 97.3 °F (36.3 °C). His blood pressure is 160/93 (abnormal) and his pulse is 68. His respiration is 16 and oxygen saturation is 98%. CONSTITUTIONAL:alert & oriented x 3, no acute distress   SKIN:  Warm and dry, no rashes   HEAD:  Normocephalic, atraumatic   EYES: PERRL. EOMs intact. EARS:  Hearing grossly WNL. NOSE:  Nares patent. No rhinorrhea   MOUTH/THROAT:  benign  NECK:supple, no lymphadenopathy  LUNGS: Clear to auscultation bilaterally, no wheezes, rales, or rhonchi. CARDIOVASCULAR: Heart sounds are normal.  Regular rate and rhythm without murmur, gallop or rub. ABDOMEN: soft, non tender, non distended, no masses or organomegaly   EXTREMITIES: no edema bilateral lower extremities     Testing:   EKG: 10/10/17  Labs pending: drawn 10/10/2017   Chest XRay:  10/10/17    IMPRESSIONS:   1.  Lumbar disc disease  2.  has a past medical history of Lumbar disc disease and Snores. PLANS:   1.  Lumbar laminectomy    REG VALDES PA-C  Electronically signed 10/10/2017 at 12:27 PM

## 2017-10-24 NOTE — INTERVAL H&P NOTE
Pt Name: Susan Navas  MRN: [id number]  YOB: 1960  Date of evaluation: 10/24/2017    I have reviewed the patient's history and physical examination completed in pre-admission testing on 10/10/17    No changes to history or on examination today, unless noted below.    none    YANETH WHITE CNP  10/24/17  6:31 AM

## 2017-10-24 NOTE — PROGRESS NOTES
Neurosurgery Post-op Note      POD#0, s/p Decompressive laminectomy, medial facetectomy, and foraminotomy at L2-3, L3-4, L4-5. Placement of subfascial Hemovac drain. SUBJECTIVE:  Patient admits to appropriate postop pain, trouble swallowing as expected s/p extubation. Talking, no shortness of breath. Hydralazine ordered for elevated BP. Awaiting pain medication. OBJECTIVE      Physical exam   VITALS:    Vitals:    10/24/17 0628   BP: (!) 170/91   Pulse: 65   Resp: 18   Temp: 96.8 °F (36 °C)   SpO2: 99%     INTAKE:    Intake/Output Summary (Last 24 hours) at 10/24/17 1312  Last data filed at 10/24/17 1250   Gross per 24 hour   Intake             3200 ml   Output             1260 ml   Net             1940 ml       Neurological exam    AO3  EOMI PERRLA  Motor and sensory intact throughout  Follows commands x4    Wound   Post op wound:  posterior lumbar spine   clean, dry and no drainage   Wound closed with staples  Dressing intact. Hemovac      ASSESSMENT AND PLAN    POD#0, s/p Decompressive laminectomy, medial facetectomy, and foraminotomy at L2-3, L3-4, L4-5. Placement of subfascial Hemovac drain.     - Transfer to neuro floor   - Neuro checks per protocol   - No anticoagulants/antiplatelets/NSAIDs   - SCDs for DVT ppx   - Clear diet, adv as tolerated   - Dangle tonight, ambulate w PT/OT in am   - HOB 30 degrees, encourage IS   - Dilaudid, percocet, flexeril   - Ancef 1g q8h x3 doses   - Monitor drain output   - CIWA protocol as needed for DTs   - Basic labs in am   - Additional recommendations may follow. Please contact neurosurgery with any changes in patients neurologic status.        Electronically signed by Delia Zhao PA-C on 10/24/2017 at 1:12 PM

## 2017-10-25 LAB
ABSOLUTE EOS #: 0 K/UL (ref 0–0.4)
ABSOLUTE IMMATURE GRANULOCYTE: ABNORMAL K/UL (ref 0–0.3)
ABSOLUTE LYMPH #: 0.9 K/UL (ref 1–4.8)
ABSOLUTE MONO #: 0.9 K/UL (ref 0.1–1.2)
ANION GAP SERPL CALCULATED.3IONS-SCNC: 11 MMOL/L (ref 9–17)
BASOPHILS # BLD: 0 %
BASOPHILS ABSOLUTE: 0 K/UL (ref 0–0.2)
BUN BLDV-MCNC: 9 MG/DL (ref 6–20)
BUN/CREAT BLD: ABNORMAL (ref 9–20)
CALCIUM SERPL-MCNC: 7.8 MG/DL (ref 8.6–10.4)
CHLORIDE BLD-SCNC: 102 MMOL/L (ref 98–107)
CO2: 23 MMOL/L (ref 20–31)
CREAT SERPL-MCNC: 0.71 MG/DL (ref 0.7–1.2)
CULTURE: NO GROWTH
CULTURE: NORMAL
DIFFERENTIAL TYPE: ABNORMAL
EOSINOPHILS RELATIVE PERCENT: 0 %
GFR AFRICAN AMERICAN: >60 ML/MIN
GFR NON-AFRICAN AMERICAN: >60 ML/MIN
GFR SERPL CREATININE-BSD FRML MDRD: ABNORMAL ML/MIN/{1.73_M2}
GFR SERPL CREATININE-BSD FRML MDRD: ABNORMAL ML/MIN/{1.73_M2}
GLUCOSE BLD-MCNC: 127 MG/DL (ref 70–99)
HCT VFR BLD CALC: 34 % (ref 41–53)
HEMOGLOBIN: 11.6 G/DL (ref 13.5–17.5)
IMMATURE GRANULOCYTES: ABNORMAL %
LYMPHOCYTES # BLD: 12 %
Lab: NORMAL
MCH RBC QN AUTO: 31.9 PG (ref 26–34)
MCHC RBC AUTO-ENTMCNC: 34.2 G/DL (ref 31–37)
MCV RBC AUTO: 93.1 FL (ref 80–100)
MONOCYTES # BLD: 12 %
PDW BLD-RTO: 13.4 % (ref 12.5–15.4)
PLATELET # BLD: 131 K/UL (ref 140–450)
PLATELET ESTIMATE: ABNORMAL
PMV BLD AUTO: 8.8 FL (ref 6–12)
POTASSIUM SERPL-SCNC: 4 MMOL/L (ref 3.7–5.3)
RBC # BLD: 3.65 M/UL (ref 4.5–5.9)
RBC # BLD: ABNORMAL 10*6/UL
SEG NEUTROPHILS: 76 %
SEGMENTED NEUTROPHILS ABSOLUTE COUNT: 5.7 K/UL (ref 1.8–7.7)
SODIUM BLD-SCNC: 136 MMOL/L (ref 135–144)
SPECIMEN DESCRIPTION: NORMAL
STATUS: NORMAL
WBC # BLD: 7.5 K/UL (ref 3.5–11)
WBC # BLD: ABNORMAL 10*3/UL

## 2017-10-25 PROCEDURE — 97166 OT EVAL MOD COMPLEX 45 MIN: CPT

## 2017-10-25 PROCEDURE — 6360000002 HC RX W HCPCS: Performed by: NEUROLOGICAL SURGERY

## 2017-10-25 PROCEDURE — 97530 THERAPEUTIC ACTIVITIES: CPT

## 2017-10-25 PROCEDURE — G8988 SELF CARE GOAL STATUS: HCPCS

## 2017-10-25 PROCEDURE — 6370000000 HC RX 637 (ALT 250 FOR IP): Performed by: NEUROLOGICAL SURGERY

## 2017-10-25 PROCEDURE — 2580000003 HC RX 258: Performed by: EMERGENCY MEDICINE

## 2017-10-25 PROCEDURE — 6370000000 HC RX 637 (ALT 250 FOR IP): Performed by: PHYSICIAN ASSISTANT

## 2017-10-25 PROCEDURE — G8987 SELF CARE CURRENT STATUS: HCPCS

## 2017-10-25 PROCEDURE — 2060000000 HC ICU INTERMEDIATE R&B

## 2017-10-25 PROCEDURE — 97162 PT EVAL MOD COMPLEX 30 MIN: CPT

## 2017-10-25 PROCEDURE — 6360000002 HC RX W HCPCS: Performed by: PHYSICIAN ASSISTANT

## 2017-10-25 PROCEDURE — 97535 SELF CARE MNGMENT TRAINING: CPT

## 2017-10-25 PROCEDURE — 2580000003 HC RX 258: Performed by: NEUROLOGICAL SURGERY

## 2017-10-25 PROCEDURE — 85025 COMPLETE CBC W/AUTO DIFF WBC: CPT

## 2017-10-25 PROCEDURE — 36415 COLL VENOUS BLD VENIPUNCTURE: CPT

## 2017-10-25 PROCEDURE — G8978 MOBILITY CURRENT STATUS: HCPCS

## 2017-10-25 PROCEDURE — G8979 MOBILITY GOAL STATUS: HCPCS

## 2017-10-25 PROCEDURE — 80048 BASIC METABOLIC PNL TOTAL CA: CPT

## 2017-10-25 RX ORDER — THIAMINE MONONITRATE (VIT B1) 100 MG
100 TABLET ORAL DAILY
Status: DISCONTINUED | OUTPATIENT
Start: 2017-10-25 | End: 2017-10-28 | Stop reason: HOSPADM

## 2017-10-25 RX ORDER — THIAMINE HYDROCHLORIDE 100 MG/ML
100 INJECTION, SOLUTION INTRAMUSCULAR; INTRAVENOUS DAILY
Status: DISCONTINUED | OUTPATIENT
Start: 2017-10-25 | End: 2017-10-25 | Stop reason: CLARIF

## 2017-10-25 RX ORDER — MULTIVITAMIN WITH FOLIC ACID 400 MCG
1 TABLET ORAL DAILY
Status: DISCONTINUED | OUTPATIENT
Start: 2017-10-25 | End: 2017-10-28 | Stop reason: HOSPADM

## 2017-10-25 RX ORDER — UBIDECARENONE 75 MG
50 CAPSULE ORAL DAILY
Status: DISCONTINUED | OUTPATIENT
Start: 2017-10-25 | End: 2017-10-28 | Stop reason: HOSPADM

## 2017-10-25 RX ORDER — FOLIC ACID 1 MG/1
1 TABLET ORAL DAILY
Status: DISCONTINUED | OUTPATIENT
Start: 2017-10-25 | End: 2017-10-28 | Stop reason: HOSPADM

## 2017-10-25 RX ADMIN — HYDROMORPHONE HYDROCHLORIDE 0.5 MG: 1 INJECTION, SOLUTION INTRAMUSCULAR; INTRAVENOUS; SUBCUTANEOUS at 01:09

## 2017-10-25 RX ADMIN — VITAMIN B12 0.1 MG ORAL TABLET 50 MCG: 0.1 TABLET ORAL at 13:10

## 2017-10-25 RX ADMIN — SODIUM CHLORIDE: 9 INJECTION, SOLUTION INTRAVENOUS at 11:34

## 2017-10-25 RX ADMIN — CYCLOBENZAPRINE 10 MG: 10 TABLET, FILM COATED ORAL at 20:13

## 2017-10-25 RX ADMIN — FAMOTIDINE 20 MG: 20 TABLET, FILM COATED ORAL at 20:13

## 2017-10-25 RX ADMIN — HYDROMORPHONE HYDROCHLORIDE 0.25 MG: 1 INJECTION, SOLUTION INTRAMUSCULAR; INTRAVENOUS; SUBCUTANEOUS at 09:17

## 2017-10-25 RX ADMIN — ONDANSETRON 4 MG: 2 INJECTION, SOLUTION INTRAMUSCULAR; INTRAVENOUS at 22:50

## 2017-10-25 RX ADMIN — HYDROMORPHONE HYDROCHLORIDE 0.25 MG: 1 INJECTION, SOLUTION INTRAMUSCULAR; INTRAVENOUS; SUBCUTANEOUS at 13:10

## 2017-10-25 RX ADMIN — THERA TABS 1 TABLET: TAB at 13:09

## 2017-10-25 RX ADMIN — Medication 100 MG: at 13:10

## 2017-10-25 RX ADMIN — CEFAZOLIN 2 G: 1 INJECTION, POWDER, FOR SOLUTION INTRAMUSCULAR; INTRAVENOUS at 01:08

## 2017-10-25 RX ADMIN — FOLIC ACID 1 MG: 1 TABLET ORAL at 13:09

## 2017-10-25 RX ADMIN — OXYCODONE HYDROCHLORIDE AND ACETAMINOPHEN 2 TABLET: 5; 325 TABLET ORAL at 16:24

## 2017-10-25 RX ADMIN — FAMOTIDINE 20 MG: 20 TABLET, FILM COATED ORAL at 08:33

## 2017-10-25 RX ADMIN — CYCLOBENZAPRINE 10 MG: 10 TABLET, FILM COATED ORAL at 13:10

## 2017-10-25 RX ADMIN — HYDROMORPHONE HYDROCHLORIDE 0.5 MG: 1 INJECTION, SOLUTION INTRAMUSCULAR; INTRAVENOUS; SUBCUTANEOUS at 05:31

## 2017-10-25 RX ADMIN — OXYCODONE HYDROCHLORIDE AND ACETAMINOPHEN 2 TABLET: 5; 325 TABLET ORAL at 08:15

## 2017-10-25 RX ADMIN — OXYCODONE HYDROCHLORIDE AND ACETAMINOPHEN 2 TABLET: 5; 325 TABLET ORAL at 04:11

## 2017-10-25 RX ADMIN — Medication 10 ML: at 08:36

## 2017-10-25 RX ADMIN — OXYCODONE HYDROCHLORIDE AND ACETAMINOPHEN 2 TABLET: 5; 325 TABLET ORAL at 00:08

## 2017-10-25 RX ADMIN — HYDRALAZINE HYDROCHLORIDE 10 MG: 20 INJECTION INTRAMUSCULAR; INTRAVENOUS at 21:09

## 2017-10-25 RX ADMIN — OXYCODONE HYDROCHLORIDE AND ACETAMINOPHEN 2 TABLET: 5; 325 TABLET ORAL at 12:17

## 2017-10-25 RX ADMIN — DOCUSATE SODIUM 100 MG: 100 CAPSULE ORAL at 20:13

## 2017-10-25 RX ADMIN — CYCLOBENZAPRINE 10 MG: 10 TABLET, FILM COATED ORAL at 08:33

## 2017-10-25 RX ADMIN — DOCUSATE SODIUM 100 MG: 100 CAPSULE ORAL at 08:33

## 2017-10-25 RX ADMIN — Medication 10 ML: at 20:13

## 2017-10-25 ASSESSMENT — PAIN SCALES - GENERAL
PAINLEVEL_OUTOF10: 7
PAINLEVEL_OUTOF10: 7
PAINLEVEL_OUTOF10: 8
PAINLEVEL_OUTOF10: 7
PAINLEVEL_OUTOF10: 10
PAINLEVEL_OUTOF10: 8
PAINLEVEL_OUTOF10: 4
PAINLEVEL_OUTOF10: 5
PAINLEVEL_OUTOF10: 7
PAINLEVEL_OUTOF10: 7
PAINLEVEL_OUTOF10: 5
PAINLEVEL_OUTOF10: 7
PAINLEVEL_OUTOF10: 5

## 2017-10-25 ASSESSMENT — PAIN DESCRIPTION - PAIN TYPE
TYPE: ACUTE PAIN;SURGICAL PAIN
TYPE: ACUTE PAIN;SURGICAL PAIN
TYPE: SURGICAL PAIN

## 2017-10-25 ASSESSMENT — PAIN DESCRIPTION - LOCATION
LOCATION: BACK

## 2017-10-25 ASSESSMENT — PAIN DESCRIPTION - FREQUENCY: FREQUENCY: CONTINUOUS

## 2017-10-25 ASSESSMENT — PAIN DESCRIPTION - DESCRIPTORS: DESCRIPTORS: SHARP;BURNING

## 2017-10-25 ASSESSMENT — PAIN DESCRIPTION - ORIENTATION
ORIENTATION: LOWER
ORIENTATION: LOWER

## 2017-10-25 NOTE — PLAN OF CARE
Problem: Falls - Risk of:  Goal: Will remain free from falls  Will remain free from falls  Outcome: Ongoing  Patient remained free from injury. Patient verbalized understanding of need for the safety precautions. Demonstrates proper use of assistive devices. Bed remains in the lowest position. Call light remains within reach. [place] [facility] in use.

## 2017-10-25 NOTE — PROGRESS NOTES
Occupational Therapy   Occupational Therapy Initial Assessment  Date: 10/25/2017   Patient Name: Tal Baez  MRN: [id number]     : 1960    Copied from neurosurgery note (10/24): Decompressive laminectomy, medial facetectomy, and foraminotomy at L2-3, L3-4, L4-5. Placement of subfascail Hemovac drain    Patient Diagnosis(es): There were no encounter diagnoses. has a past medical history of Lumbar disc disease and Snores. has a past surgical history that includes Rotator cuff repair (Right); lumbar laminectomy (N/A, 10/24/2017); and laminectomy (N/A, 10/24/2017). Restrictions  Restrictions/Precautions  Restrictions/Precautions: Seizure, Fall Risk  Required Braces or Orthoses?: No  Position Activity Restriction  Spinal Precautions: No Bending, No Lifting, No Twisting    Subjective   General  Patient assessed for rehabilitation services?: Yes  Family / Caregiver Present: No  General Comment  Comments: RN Ok'd therapy this date. Pt agreeable to session and cooperative throughout.    Pain Assessment  Patient Currently in Pain: Yes  Pain Assessment: 0-10  Pain Level: 5  Pain Type: Acute pain, Surgical pain  Pain Location: Back  Pain Orientation: Lower  Pain Descriptors: Sharp, Burning  Pain Frequency: Continuous  Clinical Progression: Not changed  Patient's Stated Pain Goal: No pain  Pain Intervention(s): Medication (see eMar), Emotional support, Therapeutic presence  Response to Pain Intervention: None  Oxygen Therapy  SpO2: 98 %  O2 Device: None (Room air)  Social/Functional History  Social/Functional History  Lives With: Family (Wife and Daughter (23))  Type of Home: House  Home Layout: Two level  Home Access: Stairs to enter with rails  Entrance Stairs - Number of Steps: 4  Entrance Stairs - Rails: Both  Bathroom Shower/Tub: Tub/Shower unit, Walk-in shower (Bathroom and bedroom on main floor- tub/shower main floor and walk-in in basement)  Bathroom Toilet: Standard  Bathroom Equipment: Shower Fluid And Coordinated: Yes  Coordination and Movement description: Decreased speed     Bed mobility  Rolling to Left: Minimal assistance (Log rolling- Min A for legs)  Rolling to Right: Minimal assistance  Supine to Sit: Contact guard assistance  Sit to Supine: Minimal assistance (Min A for legs)  Scooting: Minimal assistance  Transfers  Sit to stand: Minimal assistance  Stand to sit: Minimal assistance  Transfer Comments: Pt completed sit <> stand x2. Decreased speed and Min A provided d/t pain. Cognition  Overall Cognitive Status: WFL  Sensation  Overall Sensation Status: WFL    LUE AROM (degrees)  LUE AROM : Exceptions  L Shoulder Flexion 0-180: 0-90 (pt reports possible rotator cuff tear)   Left Hand AROM (degrees)  Left Hand AROM: WFL  RUE AROM (degrees)  RUE AROM : WFL  Right Hand AROM (degrees)  Right Hand AROM: WFL  LUE Strength  Gross LUE Strength: Exceptions to [facility]  L Shoulder Flex:  (Not assessed d/t pt reporting possible rotator cuff tear)  L Hand Grasp: 5/5  RUE Strength  Gross RUE Strength: Exceptions to [facility]  R Shoulder Flex: 4+/5  R Hand Grasp: 5/5    Assessment   Performance deficits / Impairments: Decreased functional mobility ; Decreased ADL status; Decreased strength;Decreased endurance;Decreased high-level IADLs  Prognosis: Good  Decision Making: Low Complexity  Patient Education: Pt educated on OT POC, discharge recommendation, spinal precautions, safety during transfers/functional mobility, pain management, importance of functional mobility, and safe bed mobility. Pt verbalized understanding of education.    Discharge Recommendations: Home with assist PRN;Home with Home health OT (Home Eval )  REQUIRES OT FOLLOW UP: Yes  Activity Tolerance  Activity Tolerance: Patient limited by pain  Safety Devices  Safety Devices in place: Yes  Type of devices: Left in bed;Nurse notified;Call light within reach        Discharge Recommendations:  Home with assist PRN, Home with Home health OT (Home Eval ) Plan   Plan  Times per week: 4-5 x/wk  Current Treatment Recommendations: Strengthening, Balance Training, Functional Mobility Training, Endurance Training, Pain Management, Safety Education & Training, Self-Care / ADL, Positioning    G-Code  OT G-codes  Functional Assessment Tool Used: [place]  Score: 13/20  Functional Limitation: Self care  Self Care Current Status (): At least 20 percent but less than 40 percent impaired, limited or restricted  Self Care Goal Status (): At least 1 percent but less than 20 percent impaired, limited or restricted    Goals  Short term goals  Time Frame for Short term goals: Pt will, upon discharge  Short term goal 1: Demo static/dynamic standing tolerance for 10+ mins during ADL/IADL completion with SBA  Short term goal 2: Demo sup <>sit transfer to EOB with spinal precautions with SBA  Short term goal 3: Identify 2 non-pharmacological pain management techniques with <2 vcs  Short term goal 4: Perform sit <> stand transfer with SBA  Short term goal 5: Demo spinal precautions during ADL/IADL completion with <2vcs  Short term goal 6: Demo static/dynamic sitting tolerance for 20+ mins during ADL/IADL completion with supervision  Short term goal 7: Perform LB ADLs with Min A  Short term goal 8: Perform UB ADLs with SBA       Therapy Time   Individual Concurrent Group Co-treatment   Time In 1035         Time Out 1116         Minutes 41           Discharge recommendations discussed with patient during initial evaluation.          MARIETTA Leyva

## 2017-10-25 NOTE — CARE COORDINATION
Case Management Initial Discharge Plan  [place],         Readmission Risk              Readmission Risk:        2.5       Age 72 or Greater:  0    Admitted from SNF or Requires Paid or Family Care:  0    Currently has CHF,COPD,ARF,CRI,or is on dialysis:  0    Takes more than 5 Prescription Medications:  0    Takes Digoxin,Insulin,Anticoagulants,Narcotics or ASA/Plavix:  [address] in Past 12 Months:  0    On Disability:  0    Patient Considers own Health:  2.5            Met with:patient to discuss discharge plans.    Information verified: address, contacts, phone number, , insurance Yes  PCP: Bill Broderick MD  Date of last visit: last month    Insurance Provider: [facility] [place]    Discharge Planning  Current Residence:  Private Residence  Living Arrangements:  Spouse/Significant Other   Home has 2 stories/ 5 stairs to climb  Support Systems:  Spouse/Significant Other, Children  Current Services PTA:  None Supplier:   Patient able to perform ADL's:Independent  DME used to aid ambulation prior to admission: n/a/during admission    Potential Assistance Needed:  N/A    Pharmacy: [facility] Medications:  No  Does patient want to participate in local refill/ meds to beds program?  No    Patient agreeable to home care: No  [facility] of choice provided:  n/a      Type of Home Care Services:  PT  Patient expects to be discharged to:  home    Prior SNF/Rehab Placement and Facility: n/a  Agreeable to SNF/Rehab: No  [facility] of choice provided: n/a   Evaluation: n/a    Expected Discharge date:  10/28/17  Follow Up Appointment: Best Day/ Time: Monday AM    Transportation provider: spouse  Transportation arrangements needed for discharge: No     Discharge Plan: Home with family support  Declines VNS/ECF        Electronically signed by Demario, Lillian and CompanyRIAZ on 10/25/17 at 3:14 PM

## 2017-10-25 NOTE — PROGRESS NOTES
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  Additional Comments: Patient reports that wife works, but took off three weeks to assist patient post surgery  Objective     Observation/Palpation  Posture: Fair  Observation: Patient was diaphoretic once seated EOB  Body Mechanics: Patient presented with forward head and shoulders in seated and standing positions    AROM RLE (degrees)  RLE AROM: WFL  RLE General AROM: Patient lacked hip flexion ROM, rest grossly WFL  AROM LLE (degrees)  LLE AROM : WFL  LLE General AROM: Patient lacked hip flexion ROM, rest grossly WFL  PROM RUE (degrees)  RUE PROM: WFL  RUE General PROM: Shoulder flexion was WFL passively  AROM RUE (degrees)  RUE AROM :  (impaired)  RUE General AROM: Unable to flex shoulder above 90 degrees, rest grossly WFL  PROM LUE (degrees)  LUE PROM:  (impaired)  LUE General PROM: Unable to flex shoulder greater than 90 degrees passively with empty EF  AROM LUE (degrees)  LUE AROM :  (impaired)  LUE General AROM: Unable to flex shoulder above 90 degrees, rest grossly [facility]  Strength RLE  Strength RLE: WFL  Comment: At least 3 or greater due to functional mobility during bed mobility, transfers, and gait  Strength LLE  Strength LLE: WFL  Comment: At least 3 or greater due to functional mobility during bed mobility, transfers, and gait  Strength RUE  Strength RUE: WFL  Comment: At least 3 or greater due to functional mobility during bed mobility, transfers, and gait  Strength LUE  Strength LUE: WFL  Comment:  At Making: Medium Complexity  REQUIRES PT FOLLOW UP: Yes  Activity Tolerance  Activity Tolerance: Patient limited by pain; Patient limited by endurance; Patient limited by fatigue  PT Equipment Recommendations  Equipment Needed: Yes  Mobility Devices: [place]: Rolling     Discharge Recommendations:  24 hour supervision or assist, Home with [address] [place] [address]  Times per week: 6-7x/wk  Current Treatment Recommendations: Strengthening, ROM, Balance Training, Transfer Training, Gait Training, Stair training, Endurance Training, Neuromuscular Re-education, Functional Mobility Training  Safety Devices  Type of devices: Call light within reach, Gait belt, Left in chair    G-Code  PT G-Codes  Functional Assessment Tool Used: [place] tool  Score: 12  Functional Limitation: Mobility: Walking and moving around  Mobility: Walking and Moving Around Current Status (): At least 40 percent but less than 60 percent impaired, limited or restricted  Mobility: Walking and Moving Around Goal Status (): At least 20 percent but less than 40 percent impaired, limited or restricted    Goals  Short term goals  Time Frame for Short term goals: 14 visits  Short term goal 1: Patient will complete bed mobility taks with CGA  Short term goal 2: Patient will ambulate 100ft w/ CGA using RW  Short term goal 3: Patient will complete functional transfers w/ CGA   Short term goal 4: Patient will negotiate 3 steps w/ R handrail w/ Cristina  Short term goal 5: Patient will tolerate standing balance activites w/ good minus posture       Therapy Time   Individual Concurrent Group Co-treatment   Time In 7310         Time Out 0909         Minutes 40                 JOSE Larry  Evaluation/treatment performed by Student PT under the supervision of co-signing PT who agrees with all evaluation/treatment and documentation.

## 2017-10-25 NOTE — PLAN OF CARE
Problem: Risk for Impaired Skin Integrity  Goal: Tissue integrity - skin and mucous membranes  Structural intactness and normal physiological function of skin and  mucous membranes. Outcome: Ongoing      Problem: Pain:  Goal: Pain level will decrease  Pain level will decrease   Outcome: Ongoing  Pt educated on pain medications, ow to rate his pain, repositioning and early ambulation all for pain management.

## 2017-10-25 NOTE — PROGRESS NOTES
Neurosurgery KENNY/Resident Note    Daily Progress Note     10/25/2017  11:25 AM    Chart reviewed. No new complaints. Sitting up in chair w appropriate back pain, feels legs are a little better. Admits to drinking 8-10 beers a day. Vitals:    10/24/17 2355 10/25/17 0350 10/25/17 0729 10/25/17 1105   BP: 122/64 123/67 131/62    Pulse: 78 72  105   Resp: 12 15  27   Temp: 97.7 °F (36.5 °C) 98.4 °F (36.9 °C) 98.9 °F (37.2 °C) 98.6 °F (37 °C)   TempSrc: Oral Oral Oral Oral   SpO2: 100% 95%  98%   Weight:       Height:           PE:   AOx3   CNII-XII intact   PERRL, EOMI   CVS: normal s2/s1  Resp: equal air entry bilaterally   Abd: soft, no rigidity  Motor and sensory intact all over    Drain output 80cc/12HR  Incision c/d/i      Lab Results   Component Value Date    WBC 7.5 10/25/2017    HGB 11.6 (L) 10/25/2017    HCT 34.0 (L) 10/25/2017     (L) 10/25/2017     10/25/2017    K 4.0 10/25/2017     10/25/2017    CREATININE 0.71 10/25/2017    BUN 9 10/25/2017    CO2 23 10/25/2017    INR 0.9 10/10/2017     A/P:  61 y.o. male who presents with low back pain w neurogenic claudication. MRI lumbar reveals severe canal stenosis at L2-5. POD #1 L2-5 decompressive laminectomy, medial facetectomy, foraminotomy                          - Neuro checks per protocol                        - No anticoagulants/antiplatelets/NSAIDs                        - SCDs for DVT ppx                        - General diet as tolerated                        - Ambulate w PT/OT                        - HOB 30 degrees, encourage IS                        - Dilaudid, percocet, flexeril                        - Ancef 1g q8h x3 doses                        - Monitor drain output                        - CIWA protocol as needed for DTs                        - Add thiamine, Vitamin B12/Folate                        - Additional recommendations may follow. Please contact neurosurgery with any changes in patients neurologic status.

## 2017-10-26 PROCEDURE — 6370000000 HC RX 637 (ALT 250 FOR IP): Performed by: NEUROLOGICAL SURGERY

## 2017-10-26 PROCEDURE — 99024 POSTOP FOLLOW-UP VISIT: CPT | Performed by: REGISTERED NURSE

## 2017-10-26 PROCEDURE — 97116 GAIT TRAINING THERAPY: CPT

## 2017-10-26 PROCEDURE — 6360000002 HC RX W HCPCS: Performed by: PHYSICIAN ASSISTANT

## 2017-10-26 PROCEDURE — 6370000000 HC RX 637 (ALT 250 FOR IP): Performed by: PHYSICIAN ASSISTANT

## 2017-10-26 PROCEDURE — 2060000000 HC ICU INTERMEDIATE R&B

## 2017-10-26 PROCEDURE — 97110 THERAPEUTIC EXERCISES: CPT

## 2017-10-26 PROCEDURE — 2580000003 HC RX 258: Performed by: NEUROLOGICAL SURGERY

## 2017-10-26 PROCEDURE — 2580000003 HC RX 258: Performed by: EMERGENCY MEDICINE

## 2017-10-26 PROCEDURE — 97535 SELF CARE MNGMENT TRAINING: CPT

## 2017-10-26 PROCEDURE — 97530 THERAPEUTIC ACTIVITIES: CPT

## 2017-10-26 RX ADMIN — DOCUSATE SODIUM 100 MG: 100 CAPSULE ORAL at 21:59

## 2017-10-26 RX ADMIN — OXYCODONE HYDROCHLORIDE AND ACETAMINOPHEN 2 TABLET: 5; 325 TABLET ORAL at 21:57

## 2017-10-26 RX ADMIN — OXYCODONE HYDROCHLORIDE AND ACETAMINOPHEN 2 TABLET: 5; 325 TABLET ORAL at 09:51

## 2017-10-26 RX ADMIN — CYCLOBENZAPRINE 10 MG: 10 TABLET, FILM COATED ORAL at 21:59

## 2017-10-26 RX ADMIN — OXYCODONE HYDROCHLORIDE AND ACETAMINOPHEN 2 TABLET: 5; 325 TABLET ORAL at 17:59

## 2017-10-26 RX ADMIN — Medication 1 LOZENGE: at 18:01

## 2017-10-26 RX ADMIN — OXYCODONE HYDROCHLORIDE AND ACETAMINOPHEN 2 TABLET: 5; 325 TABLET ORAL at 05:54

## 2017-10-26 RX ADMIN — THERA TABS 1 TABLET: TAB at 08:43

## 2017-10-26 RX ADMIN — FAMOTIDINE 20 MG: 20 TABLET, FILM COATED ORAL at 08:43

## 2017-10-26 RX ADMIN — OXYCODONE HYDROCHLORIDE AND ACETAMINOPHEN 2 TABLET: 5; 325 TABLET ORAL at 01:08

## 2017-10-26 RX ADMIN — CYCLOBENZAPRINE 10 MG: 10 TABLET, FILM COATED ORAL at 08:43

## 2017-10-26 RX ADMIN — OXYCODONE HYDROCHLORIDE AND ACETAMINOPHEN 1 TABLET: 5; 325 TABLET ORAL at 14:05

## 2017-10-26 RX ADMIN — CYCLOBENZAPRINE 10 MG: 10 TABLET, FILM COATED ORAL at 13:04

## 2017-10-26 RX ADMIN — Medication 100 MG: at 08:43

## 2017-10-26 RX ADMIN — Medication 10 ML: at 22:00

## 2017-10-26 RX ADMIN — FOLIC ACID 1 MG: 1 TABLET ORAL at 08:43

## 2017-10-26 RX ADMIN — Medication 10 ML: at 08:45

## 2017-10-26 RX ADMIN — HYDRALAZINE HYDROCHLORIDE 10 MG: 20 INJECTION INTRAMUSCULAR; INTRAVENOUS at 09:49

## 2017-10-26 RX ADMIN — VITAMIN B12 0.1 MG ORAL TABLET 50 MCG: 0.1 TABLET ORAL at 08:43

## 2017-10-26 RX ADMIN — Medication 1 LOZENGE: at 14:08

## 2017-10-26 RX ADMIN — DOCUSATE SODIUM 100 MG: 100 CAPSULE ORAL at 08:43

## 2017-10-26 RX ADMIN — SODIUM CHLORIDE: 9 INJECTION, SOLUTION INTRAVENOUS at 10:34

## 2017-10-26 RX ADMIN — FAMOTIDINE 20 MG: 20 TABLET, FILM COATED ORAL at 21:59

## 2017-10-26 RX ADMIN — Medication 10 ML: at 21:59

## 2017-10-26 ASSESSMENT — PAIN SCALES - GENERAL
PAINLEVEL_OUTOF10: 7
PAINLEVEL_OUTOF10: 6
PAINLEVEL_OUTOF10: 7
PAINLEVEL_OUTOF10: 6
PAINLEVEL_OUTOF10: 8
PAINLEVEL_OUTOF10: 7
PAINLEVEL_OUTOF10: 6

## 2017-10-26 ASSESSMENT — PAIN DESCRIPTION - LOCATION
LOCATION: BACK
LOCATION: ABDOMEN
LOCATION: BACK

## 2017-10-26 ASSESSMENT — PAIN DESCRIPTION - FREQUENCY
FREQUENCY: CONTINUOUS
FREQUENCY: CONTINUOUS

## 2017-10-26 ASSESSMENT — PAIN DESCRIPTION - PAIN TYPE
TYPE: SURGICAL PAIN
TYPE: SURGICAL PAIN
TYPE: ACUTE PAIN;SURGICAL PAIN
TYPE: ACUTE PAIN;SURGICAL PAIN

## 2017-10-26 ASSESSMENT — PAIN DESCRIPTION - DESCRIPTORS: DESCRIPTORS: BURNING

## 2017-10-26 ASSESSMENT — PAIN DESCRIPTION - ORIENTATION
ORIENTATION: LOWER
ORIENTATION: LOWER

## 2017-10-26 ASSESSMENT — PAIN DESCRIPTION - ONSET: ONSET: ON-GOING

## 2017-10-26 NOTE — PLAN OF CARE
Problem: Risk for Impaired Skin Integrity  Goal: Tissue integrity - skin and mucous membranes  Structural intactness and normal physiological function of skin and  mucous membranes.    Outcome: Ongoing      Problem: Pain:  Goal: Pain level will decrease  Pain level will decrease   Outcome: Ongoing  Pain medication PRN  Goal: Control of acute pain  Control of acute pain   Outcome: Ongoing    Goal: Control of chronic pain  Control of chronic pain   Outcome: Ongoing      Problem: Falls - Risk of:  Goal: Absence of physical injury  Absence of physical injury   Outcome: Ongoing  Call light in reach, side rails up, bed in lowest position    Problem: Falls - Risk of  Goal: Absence of falls  Outcome: Ongoing

## 2017-10-26 NOTE — PROGRESS NOTES
Occupational Therapy  Facility/Department: [facility] NEURO  Daily Treatment Note  NAME: Zayda Story  : 1960  MRN: [id number]    Date of Service: 10/26/2017    Patient Diagnosis(es): There were no encounter diagnoses. has a past medical history of Lumbar disc disease and Snores. has a past surgical history that includes Rotator cuff repair (Right); lumbar laminectomy (N/A, 10/24/2017); and laminectomy (N/A, 10/24/2017). Restrictions  Restrictions/Precautions  Restrictions/Precautions: General Precautions, Seizure, Fall Risk  Required Braces or Orthoses?: No  Position Activity Restriction  Spinal Precautions: No Bending, No Lifting, No Twisting  Subjective   General  Patient assessed for rehabilitation services?: Yes  Family / Caregiver Present: No    Pain Assessment  Patient Currently in Pain: Yes  Pain Level: 8  Pain Type: Surgical pain  Pain Location: Back  Pain Orientation: Lower  Pain Frequency: Continuous  Pain Intervention(s): Medication (see eMar);Repositioned  Vital Signs  Patient Currently in Pain: Yes   Orientation  Orientation  Overall Orientation Status: Within Functional Limits  Objective    ADL  Feeding: Independent  Grooming: Setup;Stand by assistance; Increased time to complete  UE Bathing: Setup; Increased time to complete;Minimal assistance  LE Bathing: Setup; Increased time to complete; Moderate assistance;Maximum assistance ( pt demo static standing for backside mgt )  UE Dressing: Setup; Increased time to complete;Minimal assistance  LE Dressing: Dependent/Total  Toileting: Setup;Contact guard assistance (stood to void in urinal)  Additional Comments: pt tolerated sitting in chair to complete ADLs on table top. Upon arrival pt was completing breakfast and was sweating badly noted. Notified RN whom arrived to take BP reading d/t pt had an incident w/ PTA during functional mob. RN reports pt became Hypertensive and diaphoretic which required pt to return to sitting in chair.  RN took BP reading which was 171/77, rechecked again 171/79. RN requested pt remain seated for ADLs and provided medicine mgt. Balance  Sitting Balance: Independent (seated in recliner)  Standing Balance: Contact guard assistance  Standing Balance  Sit to stand: Minimal assistance (+2 from recliner)  Stand to sit: Minimal assistance     Transfers  Sit to stand: Minimal assistance (+2 from recliner)  Stand to sit: Minimal assistance  Attendance  Participation: Active participation    Assessment   Performance deficits / Impairments: Decreased functional mobility ; Decreased ADL status; Decreased strength;Decreased endurance;Decreased high-level IADLs  Prognosis: Good  Patient Education: Ed on OT services/POC, spinal prec, importance of using HIBICLENS, DME/AE, home safety, fall prevention tips, EC/WS- good return  Discharge Recommendations: Home with assist PRN;Home with Home health OT  REQUIRES OT FOLLOW UP: Yes  Activity Tolerance  Activity Tolerance: Patient limited by pain  Safety Devices  Safety Devices in place: Yes  Type of devices: All fall risk precautions in place;Call light within reach; Chair alarm in place; Left in chair;Nurse notified       Discharge Recommendations:  Home with assist PRN, Home with [facility]  Times per week: 4-5 x/wk  Current Treatment Recommendations: Strengthening, Balance Training, Functional Mobility Training, Endurance Training, Pain Management, Safety Education & Training, Self-Care / ADL, Positioning  Goals  Short term goals  Time Frame for Short term goals: Pt will, upon discharge  Short term goal 1: Demo static/dynamic standing tolerance for 10+ mins during ADL/IADL completion with SBA  Short term goal 2: Demo sup <>sit transfer to EOB with spinal precautions with SBA  Short term goal 3: Identify 2 non-pharmacological pain management techniques with <2 vcs  Short term goal 4: Perform sit <> stand transfer with SBA  Short term goal 5: Demo spinal precautions during ADL/IADL completion with <2vcs  Short term goal 6: Demo static/dynamic sitting tolerance for 20+ mins during ADL/IADL completion with supervision  Short term goal 7: Perform LB ADLs with Min A  Short term goal 8: Perform UB ADLs with SBA       Therapy Time   Individual Concurrent Group Co-treatment   Time In  9:40         Time Out  10:45         75 FIORDALIZA ThomasA/L

## 2017-10-26 NOTE — PLAN OF CARE
Problem: Falls - Risk of  Goal: Absence of falls  Outcome: Ongoing  Patient remained free from injury. Patient verbalized understanding of need for the safety precautions. Demonstrates proper use of assistive devices. Bed remains in the lowest position. Call light remains within reach.  [place] Program in use. '

## 2017-10-26 NOTE — OP NOTE
confirm that the correct levels were reached. The spinous process of L2, L3, L4, and L5 were then removed using the [facility] instrument. The lamina was then thinned down using the high-speed Pamela drill. Kerrison punch was then used to remove the remaining portion of the lamina at L2, L3, L4, and L5. High-speed Pamela drill was then used to remove a portion of the medial facet. Medial facetectomy and foraminotomy was then completed using the Kerrison punch. Thecal sac and nerve root at L2, L3, L4, and L5 appeared to be nicely decompressed. The wound was irrigated copiously. Hemostasis obtained with bipolar cautery. Hemovac drain was left in the subfascial space. Vancomycin powder was sprinkled into the wound. The wound was then closed in layers. Fascia was closed with 0 Vicryl placed in simple interrupted fashion. Subcutaneous fat was closed with 2-0 Vicryl placed in simple interrupted fashion. Dermis was closed with 2-0 Vicryl placed in simple interrupted buried fashion. Surgical staples were applied to the skin. Hemovac drain was secured to the skin using 3-0 nylon. Sterile dressing was applied the wound. The patient was then turned to anesthesia at the end of this case. Sponge needle and instrument counts: Sponge needle and instrument counts were correct at the end of this case.     Disposition: PACU

## 2017-10-26 NOTE — PROGRESS NOTES
ease transfer  Ambulation  Ambulation?: Yes  Ambulation 1  Surface: level tile  Device: Rolling Walker  Assistance: Minimal guard assistance  Quality of Gait: very short shuffling steps initiallly progressing with distance, guarded  Distance: 20ft  Comments: pt became very diaphoretic with gait, BP in sitting after gait completed 199/95 then retaken 171/79; RN notified; pt reporting diaphoresis is his baseline   Stairs/Curb  Stairs?: No     Balance  Posture: Fair  Sitting - Static: Good  Sitting - Dynamic: Fair;+  Standing - Static: Fair;-  Standing - Dynamic: Fair;-  Comments: Pt sat EOB 5-10mins SBA, pt c/o dizziness that improved with time  Exercises  Quad Sets: BLE x 10   Heelslides: BLE x 10 AAROM for increased range  Gluteal Sets: x 10   Knee Short Arc Quad: BLE x 10   Ankle Pumps: BLE x 10            Assessment   Body structures, Functions, Activity limitations: Decreased functional mobility ; Decreased ROM; Decreased strength;Decreased balance;Decreased endurance  Assessment: Patient reports that he believes he has a torn rotator cuff on the L side.   modA for bed mob, Cristina for gait and transfers, pt able to amb 20ft w/RW, pt became very diaphoretic during ambulation, BP increased to 199/95; pt expected to be appropriate to return home pending further progress  Specific instructions for Next Treatment: log rolling ; WATCH BP  Prognosis: Good  REQUIRES PT FOLLOW UP: Yes  Activity Tolerance  Activity Tolerance: Patient limited by pain;Treatment limited secondary to medical complications (free text)  Activity Tolerance: hypertension with short distance ambulation   PT Equipment Recommendations  Equipment Needed: Yes  Arron Cavazos: Rolling       Discharge Recommendations:  24 hour supervision or assist, Home with Home health PT            Goals  Short term goals  Time Frame for Short term goals: 14 visits  Short term goal 1: Patient will complete bed mobility taks with CGA  Short term goal 2: Patient will ambulate 100ft w/ CGA using RW  Short term goal 3: Patient will complete functional transfers w/ CGA   Short term goal 4: Patient will negotiate 3 steps w/ R handrail w/ Cristina  Short term goal 5: Patient will tolerate standing balance activites w/ good minus posture    Plan    Plan  Times per week: 6-7x/wk  Specific instructions for Next Treatment: log rolling ; WATCH BP  Current Treatment Recommendations: Strengthening, ROM, Balance Training, Transfer Training, Gait Training, Stair training, Endurance Training, Neuromuscular Re-education, Functional Mobility Training  Safety Devices  Type of devices: Call light within reach, Gait belt, Left in chair, All fall risk precautions in place, Nurse notified     Therapy Time   Individual Concurrent Group Co-treatment   Time In 0843         Time Out 0930         Minutes [address] PTA

## 2017-10-27 ENCOUNTER — APPOINTMENT (OUTPATIENT)
Dept: GENERAL RADIOLOGY | Age: 57
DRG: 520 | End: 2017-10-27
Attending: NEUROLOGICAL SURGERY
Payer: COMMERCIAL

## 2017-10-27 PROBLEM — I10 HTN (HYPERTENSION): Status: ACTIVE | Noted: 2017-10-27

## 2017-10-27 PROBLEM — K59.00 CONSTIPATION: Status: ACTIVE | Noted: 2017-10-27

## 2017-10-27 PROBLEM — Z98.890 S/P LAMINECTOMY: Status: ACTIVE | Noted: 2017-10-27

## 2017-10-27 PROBLEM — R14.0 ABDOMINAL DISTENSION: Status: ACTIVE | Noted: 2017-10-27

## 2017-10-27 PROCEDURE — 99255 IP/OBS CONSLTJ NEW/EST HI 80: CPT | Performed by: INTERNAL MEDICINE

## 2017-10-27 PROCEDURE — 2060000000 HC ICU INTERMEDIATE R&B

## 2017-10-27 PROCEDURE — 6370000000 HC RX 637 (ALT 250 FOR IP): Performed by: PHYSICIAN ASSISTANT

## 2017-10-27 PROCEDURE — 6360000002 HC RX W HCPCS: Performed by: NEUROLOGICAL SURGERY

## 2017-10-27 PROCEDURE — 6370000000 HC RX 637 (ALT 250 FOR IP): Performed by: INTERNAL MEDICINE

## 2017-10-27 PROCEDURE — 99024 POSTOP FOLLOW-UP VISIT: CPT | Performed by: REGISTERED NURSE

## 2017-10-27 PROCEDURE — 97110 THERAPEUTIC EXERCISES: CPT

## 2017-10-27 PROCEDURE — 6370000000 HC RX 637 (ALT 250 FOR IP): Performed by: REGISTERED NURSE

## 2017-10-27 PROCEDURE — 97116 GAIT TRAINING THERAPY: CPT

## 2017-10-27 PROCEDURE — 74000 XR ABDOMEN LIMITED (KUB): CPT

## 2017-10-27 PROCEDURE — 6360000002 HC RX W HCPCS: Performed by: PHYSICIAN ASSISTANT

## 2017-10-27 PROCEDURE — 2580000003 HC RX 258: Performed by: NEUROLOGICAL SURGERY

## 2017-10-27 PROCEDURE — 97530 THERAPEUTIC ACTIVITIES: CPT

## 2017-10-27 PROCEDURE — 6370000000 HC RX 637 (ALT 250 FOR IP): Performed by: NEUROLOGICAL SURGERY

## 2017-10-27 PROCEDURE — 2580000003 HC RX 258: Performed by: EMERGENCY MEDICINE

## 2017-10-27 PROCEDURE — 6360000002 HC RX W HCPCS: Performed by: EMERGENCY MEDICINE

## 2017-10-27 PROCEDURE — 97535 SELF CARE MNGMENT TRAINING: CPT

## 2017-10-27 RX ORDER — LABETALOL HYDROCHLORIDE 5 MG/ML
10 INJECTION, SOLUTION INTRAVENOUS EVERY 4 HOURS PRN
Status: DISCONTINUED | OUTPATIENT
Start: 2017-10-27 | End: 2017-10-28 | Stop reason: HOSPADM

## 2017-10-27 RX ORDER — BISACODYL 10 MG
10 SUPPOSITORY, RECTAL RECTAL DAILY PRN
Status: DISCONTINUED | OUTPATIENT
Start: 2017-10-27 | End: 2017-10-28 | Stop reason: HOSPADM

## 2017-10-27 RX ORDER — HYDRALAZINE HYDROCHLORIDE 20 MG/ML
10 INJECTION INTRAMUSCULAR; INTRAVENOUS ONCE
Status: COMPLETED | OUTPATIENT
Start: 2017-10-27 | End: 2017-10-27

## 2017-10-27 RX ORDER — SODIUM PHOSPHATE, DIBASIC AND SODIUM PHOSPHATE, MONOBASIC 7; 19 G/133ML; G/133ML
1 ENEMA RECTAL
Status: ACTIVE | OUTPATIENT
Start: 2017-10-27 | End: 2017-10-27

## 2017-10-27 RX ORDER — POLYETHYLENE GLYCOL 3350 17 G/17G
51 POWDER, FOR SOLUTION ORAL ONCE
Status: COMPLETED | OUTPATIENT
Start: 2017-10-27 | End: 2017-10-27

## 2017-10-27 RX ORDER — SENNA PLUS 8.6 MG/1
1 TABLET ORAL NIGHTLY
Status: DISCONTINUED | OUTPATIENT
Start: 2017-10-27 | End: 2017-10-28 | Stop reason: HOSPADM

## 2017-10-27 RX ADMIN — CYCLOBENZAPRINE 10 MG: 10 TABLET, FILM COATED ORAL at 08:55

## 2017-10-27 RX ADMIN — BISACODYL 10 MG: 10 SUPPOSITORY RECTAL at 15:40

## 2017-10-27 RX ADMIN — Medication 10 ML: at 20:39

## 2017-10-27 RX ADMIN — VITAMIN B12 0.1 MG ORAL TABLET 50 MCG: 0.1 TABLET ORAL at 08:56

## 2017-10-27 RX ADMIN — Medication 10 ML: at 09:00

## 2017-10-27 RX ADMIN — HYDROMORPHONE HYDROCHLORIDE 0.25 MG: 1 INJECTION, SOLUTION INTRAMUSCULAR; INTRAVENOUS; SUBCUTANEOUS at 05:45

## 2017-10-27 RX ADMIN — Medication 10 ML: at 20:37

## 2017-10-27 RX ADMIN — METOPROLOL TARTRATE 25 MG: 25 TABLET ORAL at 12:12

## 2017-10-27 RX ADMIN — THERA TABS 1 TABLET: TAB at 08:56

## 2017-10-27 RX ADMIN — FOLIC ACID 1 MG: 1 TABLET ORAL at 08:56

## 2017-10-27 RX ADMIN — SENNA 8.6 MG: 8.6 TABLET, COATED ORAL at 15:39

## 2017-10-27 RX ADMIN — FAMOTIDINE 20 MG: 20 TABLET, FILM COATED ORAL at 08:56

## 2017-10-27 RX ADMIN — HYDROMORPHONE HYDROCHLORIDE 0.25 MG: 1 INJECTION, SOLUTION INTRAMUSCULAR; INTRAVENOUS; SUBCUTANEOUS at 00:16

## 2017-10-27 RX ADMIN — DOCUSATE SODIUM 100 MG: 100 CAPSULE ORAL at 08:56

## 2017-10-27 RX ADMIN — OXYCODONE HYDROCHLORIDE AND ACETAMINOPHEN 2 TABLET: 5; 325 TABLET ORAL at 04:30

## 2017-10-27 RX ADMIN — FAMOTIDINE 20 MG: 20 TABLET, FILM COATED ORAL at 20:37

## 2017-10-27 RX ADMIN — CYCLOBENZAPRINE 10 MG: 10 TABLET, FILM COATED ORAL at 20:37

## 2017-10-27 RX ADMIN — HYDRALAZINE HYDROCHLORIDE 10 MG: 20 INJECTION INTRAMUSCULAR; INTRAVENOUS at 06:53

## 2017-10-27 RX ADMIN — Medication 100 MG: at 08:55

## 2017-10-27 RX ADMIN — HYDRALAZINE HYDROCHLORIDE 10 MG: 20 INJECTION INTRAMUSCULAR; INTRAVENOUS at 05:31

## 2017-10-27 RX ADMIN — DOCUSATE SODIUM 100 MG: 100 CAPSULE ORAL at 20:37

## 2017-10-27 RX ADMIN — POLYETHYLENE GLYCOL 3350 51 G: 17 POWDER, FOR SOLUTION ORAL at 15:40

## 2017-10-27 RX ADMIN — OXYCODONE HYDROCHLORIDE AND ACETAMINOPHEN 2 TABLET: 5; 325 TABLET ORAL at 23:52

## 2017-10-27 RX ADMIN — CYCLOBENZAPRINE 10 MG: 10 TABLET, FILM COATED ORAL at 15:39

## 2017-10-27 RX ADMIN — ONDANSETRON 4 MG: 2 INJECTION, SOLUTION INTRAMUSCULAR; INTRAVENOUS at 08:51

## 2017-10-27 ASSESSMENT — PAIN DESCRIPTION - LOCATION
LOCATION: BACK
LOCATION: ABDOMEN;BACK
LOCATION: BACK

## 2017-10-27 ASSESSMENT — PAIN DESCRIPTION - DESCRIPTORS
DESCRIPTORS: ACHING

## 2017-10-27 ASSESSMENT — PAIN DESCRIPTION - PROGRESSION
CLINICAL_PROGRESSION: GRADUALLY IMPROVING

## 2017-10-27 ASSESSMENT — PAIN SCALES - GENERAL
PAINLEVEL_OUTOF10: 5
PAINLEVEL_OUTOF10: 5
PAINLEVEL_OUTOF10: 6
PAINLEVEL_OUTOF10: 6
PAINLEVEL_OUTOF10: 4
PAINLEVEL_OUTOF10: 5
PAINLEVEL_OUTOF10: 6
PAINLEVEL_OUTOF10: 6
PAINLEVEL_OUTOF10: 4

## 2017-10-27 ASSESSMENT — PAIN DESCRIPTION - ORIENTATION
ORIENTATION: LOWER;MID;POSTERIOR
ORIENTATION: LOWER

## 2017-10-27 ASSESSMENT — PAIN DESCRIPTION - FREQUENCY
FREQUENCY: INTERMITTENT
FREQUENCY: CONTINUOUS

## 2017-10-27 ASSESSMENT — PAIN DESCRIPTION - ONSET
ONSET: ON-GOING
ONSET: ON-GOING

## 2017-10-27 ASSESSMENT — PAIN DESCRIPTION - PAIN TYPE
TYPE: ACUTE PAIN
TYPE: ACUTE PAIN
TYPE: ACUTE PAIN;SURGICAL PAIN

## 2017-10-27 NOTE — CARE COORDINATION
Nandini Bishop for home care order,Script for shower chair and raised toilet seat.  Patient wants to go home with home care for PT.

## 2017-10-27 NOTE — PROGRESS NOTES
Caridad Constantino NP orders senna tab, and dulcolax suppository for patients c/o needing to have a BM. Patient adequately refuses to have suppository. \"Does not want anything stuck up my a__\". Writer contacts Alfa Carmona NP for other orders, also Internal med here to see, and informed.

## 2017-10-27 NOTE — PROGRESS NOTES
Patient states he does feel better, has been passing a lot of flatus. States has not had a BM yet, but should later.

## 2017-10-27 NOTE — PROCEDURES
Drain Removal Note    Hemovac drain to lumbar incision site removed from suction and prepped with betadine. Drain suture cut and drain removed. Dry sterile dressing applied to incision site. No complications, patient tolerated procedure well.     --  Isela Lopez CNP  9:22 AM

## 2017-10-27 NOTE — CONSULTS
cough, shortness of breath, wheezing    CARDIOVASCULAR:  negative for chest pain, palpitations, syncope, edema    GASTROINTESTINAL:  Positive for abd pain   GENITOURINARY:  negative for incontinence    MUSCULOSKELETAL:  negative for neck pain   NEUROLOGICAL:  negative for headaches   PSYCHIATRIC:  negative for depressed mood, anxiety         Physical Exam:    Vitals: BP (!) 173/84   Pulse 109   Temp 97.8 °F (36.6 °C)   Resp 16   Ht 6' (1.829 m)   Wt 225 lb (102.1 kg)   SpO2 97%   BMI 30.52 kg/m²     Physical Examination:   General appearance - alert and in no distress  Mental status - alert, oriented to person, place, and time  Eyes - pupils equal and reactive, extraocular eye movements intact  Mouth - mucous membranes moist, pharynx normal without lesions  Chest - clear to auscultation, no wheezes, symmetric air entry  Heart - normal rate, regular rhythm, normal S1, S2  Abdomen - distended, tender to palpation  Neurological - alert, oriented, normal speech, no focal deficits   Extremities - no edema, no clubbing or cyanosis  Skin - normal coloration and turgor, no rashes      Medications:Current Inpatient    Scheduled Meds:   senna  1 tablet Oral Nightly    polyethylene glycol  51 g Oral Once    vitamin B-12  50 mcg Oral Daily    folic acid  1 mg Oral Daily    multivitamin  1 tablet Oral Daily    vitamin B-1  100 mg Oral Daily    sodium chloride flush  10 mL Intravenous 2 times per day    docusate sodium  100 mg Oral BID    cyclobenzaprine  10 mg Oral TID    famotidine  20 mg Oral BID    sodium chloride flush  10 mL Intravenous 2 times per day     Continuous Infusions:   PRN Meds:bisacodyl, labetalol, benzocaine-menthol, sodium chloride flush, acetaminophen, ondansetron, oxyCODONE-acetaminophen **OR** oxyCODONE-acetaminophen, HYDROmorphone **OR** HYDROmorphone, hydrALAZINE, sodium chloride flush, LORazepam **OR** LORazepam **OR** LORazepam **OR** LORazepam **OR** LORazepam **OR** LORazepam **OR** LORazepam **OR** LORazepam      LABS:-    CBC:   Recent Labs      10/25/17   0613   WBC  7.5   HGB  11.6*   PLT  131*     BMP:    Recent Labs      10/25/17   0613   NA  136   K  4.0   CL  102   CO2  23   BUN  9   CREATININE  0.71   GLUCOSE  127*     Calcium:  Recent Labs      10/25/17   0613   CALCIUM  7.8*     Ionized Calcium:No results for input(s): IONCA in the last 72 hours. Magnesium:No results for input(s): MG in the last 72 hours. Phosphorus:No results for input(s): PHOS in the last 72 hours. BNP:No results for input(s): BNP in the last 72 hours. Glucose:No results for input(s): POCGLU in the last 72 hours. HgbA1C: No results for input(s): LABA1C in the last 72 hours. INR: No results for input(s): INR in the last 72 hours. Hepatic: No results for input(s): ALKPHOS, ALT, AST, PROT, BILITOT, BILIDIR, LABALBU in the last 72 hours. Amylase and Lipase:No results for input(s): LACTA, AMYLASE in the last 72 hours. Lactic Acid: No results for input(s): LACTA in the last 72 hours. CARDIAC ENZYMES:No results for input(s): CKTOTAL, CKMB, CKMBINDEX, TROPONINI in the last 72 hours. BNP: No results for input(s): BNP in the last 72 hours. Lipids: No results for input(s): CHOL, TRIG, HDL, LDLCALC in the last 72 hours.     Invalid input(s): LDL  ABGs: No results found for: PH, PCO2, PO2, HCO3, O2SAT  Thyroid: No results found for: TSH   Urinalysis:   Color, UA   Date Value Ref Range Status   10/10/2017 YELLOW YEL Final     pH, UA   Date Value Ref Range Status   10/10/2017 5.0 5.0 - 8.0 Final     Specific Gravity, UA   Date Value Ref Range Status   10/10/2017 1.014 1.005 - 1.030 Final     Protein, UA   Date Value Ref Range Status   10/10/2017 NEGATIVE NEG Final     Nitrite, Urine   Date Value Ref Range Status   10/10/2017 NEGATIVE NEG Final     Leukocyte Esterase, Urine   Date Value Ref Range Status   10/10/2017 NEGATIVE NEG Final     Glucose, Ur   Date Value Ref Range Status   10/10/2017 NEGATIVE NEG Final Bilirubin Urine   Date Value Ref Range Status   10/10/2017 NEGATIVE NEG Final       Imaging    Xr Chest Standard (2 Vw)    Result Date: 10/10/2017  EXAMINATION: TWO VIEWS OF THE CHEST 10/10/2017 12:34 pm COMPARISON: None. HISTORY: ORDERING SYSTEM PROVIDED HISTORY: preop lumbar lami TECHNOLOGIST PROVIDED HISTORY: Reason for exam:->preop lumbar lami Acute. Initial evaluation. FINDINGS: The cardiac silhouette and mediastinal contours are normal.  The lungs are clear. No parenchymal lung infiltrate. No pleural effusion or pneumothorax. Degenerative changes are noted along the thoracic spine. 1. No acute cardiopulmonary disease. Xr Lumbar Spine 1 Vw    Result Date: 10/24/2017  EXAMINATION: SINGLE VIEW OF THE LUMBAR SPINE 10/24/2017 9:23 am COMPARISON: Lumbar spine radiographs from 09/25/2017. HISTORY: ORDERING SYSTEM PROVIDED HISTORY: decompressive laminectomy l2-l5 TECHNOLOGIST PROVIDED HISTORY: Reason for exam:->decompressive laminectomy l2-l5 FINDINGS: 2 images obtained. Fluoro time: 7.8 seconds. DAP - 0.51 mGy. Intraoperative fluoroscopy demonstrates localization over the L5-S1 disc space with additional metallic instruments projecting over the posterior aspect of the lumbar spine. Fluoroscopic technique limits detail. A radiologist was not present for the procedure. For further findings and details, please refer to the operative report. Intraoperative fluoroscopy with localization over the L5-S1 disc space.        Assessment and Plan       Active Problems:    S/P laminectomy    Abdominal distension    Constipation    HTN (hypertension)        Recommendations:    - Check KUB for abdominal distension  - Miralax as no BM/flatus past 3 days  - Labetalol PRN for BP greater than 160  - Pain control per primary  - Withdrawal unlikely given date of last drink  - HTN likely secondary to pain/constipation  - Call with questions        Kahlil Sloan MD  Department of [place]

## 2017-10-27 NOTE — PLAN OF CARE
Problem: Risk for Impaired Skin Integrity  Goal: Tissue integrity - skin and mucous membranes  Structural intactness and normal physiological function of skin and  mucous membranes. Outcome: Ongoing  Patient able to shift body weight without assistance. Encouraging movement and turning. Problem: Pain:  Goal: Pain level will decrease  Pain level will decrease   Outcome: Ongoing  Patient given pain medication upon request. Patient rests comfortably after medication administration. Problem: Falls - Risk of:  Goal: Will remain free from falls  Will remain free from falls   Outcome: Ongoing  Patient calls out appropriately. Call light within reach, bed in lowest position, padded side rails. Hourly rounding being performed.

## 2017-10-27 NOTE — PROGRESS NOTES
to Sit: Minimal assistance (required assist with upper trunk)  Sit to Supine: Minimal assistance (required assist with BLEs)  Comment: pt required max vc's for sequencing with poor carryover, moderate tactile cues needed for logroll technique  Transfers  Sit to Stand: Contact guard assistance (pt completed 4x during session, increased time to complete d/t pain)  Stand to sit: Contact guard assistance  Ambulation  Ambulation?: Yes  Ambulation 1  Surface: level tile  Device: Rolling Walker  Assistance: Contact guard assistance  Quality of Gait: shuffling steps initially which improved with vc's, decreased oswaldo  Distance: 120ft, 15ftx 2  Stairs/Curb  Stairs?: Yes  Stairs  # Steps : 10  Stairs Height: 6\"  Rails: Bilateral  Device: No Device  Assistance: Contact guard assistance     Balance  Posture: Fair  Sitting - Static: Good  Sitting - Dynamic: Fair;+  Standing - Static: Fair  Standing - Dynamic: Fair       Exercises:  Seated LE exercise program: Long Arc Quads, hip abduction/adduction, heel/toe raises, and marches. Reps: 10x each AROM    Assessment   Body structures, Functions, Activity limitations: Decreased functional mobility ; Decreased balance;Decreased endurance;Decreased strength  Assessment: Pt required Cristina for bed mobility and mod verbal/tactile cues for correct completion of logroll. Improved ambulation distance today. Pt fatigues easily and requires increased time to complete all activity d/t pain.    Prognosis: Good  Patient Education: provided pt with written instructions on performing log roll, HEP, safety with mobility, spinal precautions  REQUIRES PT FOLLOW UP: Yes  Activity Tolerance  Activity Tolerance: Patient limited by pain  Activity Tolerance: diaphoretic, seated /84  PT Equipment Recommendations  Equipment Needed: Yes  Mobility Devices: Lashawn Mars: Rolling       Discharge Recommendations:  Home with Home health PT, 24 hour supervision or assist         Goals  Short term goals  Time Frame for Short term goals: 14 visits  Short term goal 1: Patient will complete bed mobility taks with CGA  Short term goal 2: Patient will ambulate 100ft w/ CGA using RW  Short term goal 3: Patient will complete functional transfers w/ CGA   Short term goal 4: Patient will negotiate 3 steps w/ R handrail w/ Cristina  Short term goal 5: Patient will tolerate standing balance activites w/ good minus posture    Plan    Plan  Times per week: 6-7x/wk  Specific instructions for Next Treatment: log rolling ; WATCH BP  Current Treatment Recommendations: Strengthening, ROM, Balance Training, Transfer Training, Gait Training, Stair training, Endurance Training, Neuromuscular Re-education, Functional Mobility Training  Safety Devices  Type of devices:  All fall risk precautions in place, Gait belt     Therapy Time   Individual Concurrent Group Co-treatment   Time In 1050         Time Out 1136         Minutes 46          Total treatment time: 40 minutes       Maggie Gomez PTA

## 2017-10-27 NOTE — PROGRESS NOTES
Neurosurgery [facility]/Resident Note    Daily Progress Note     10/27/2017  12:17 PM    Chart reviewed. No new complaints. Sitting up in chair. Moves slowly. States he is not passing gas. RN reports BP elevated despite PRN hydralazine. Vitals:    10/27/17 0548 10/27/17 0620 10/27/17 0731 10/27/17 0900   BP: (!) 174/81 (!) 163/79 (!) 182/79 (!) 171/81   Pulse: 103 94 103 103   Resp: 18 14 17    Temp:   99.6 °F (37.6 °C)    TempSrc:   Oral    SpO2:   96%    Weight:       Height:           PE:   AOx3   CNII-XII intact   PERRL, EOMI   CVS: normal s2/s1  Resp: equal air entry bilaterally   Abd: soft, no rigidity  Motor and sensory intact all over    Drain output 0cc/12HR  Incision c/d/i      Lab Results   Component Value Date    WBC 7.5 10/25/2017    HGB 11.6 (L) 10/25/2017    HCT 34.0 (L) 10/25/2017     (L) 10/25/2017     10/25/2017    K 4.0 10/25/2017     10/25/2017    CREATININE 0.71 10/25/2017    BUN 9 10/25/2017    CO2 23 10/25/2017    INR 0.9 10/10/2017     A/P:  61 y.o. male who presents with low back pain w neurogenic claudication. MRI lumbar reveals severe canal stenosis at L2-5. POD #3 L2-5 decompressive laminectomy, medial facetectomy, foraminotomy                          - Neuro checks per protocol                        - No anticoagulants/antiplatelets/NSAIDs                        - SCDs for DVT ppx                        - General diet as tolerated                        - PT eval recommend home with PT                        - HOB 30 degrees, encourage IS                        - Dilaudid, percocet, flexeril                        - Discontinue HV drain today                        - CIWA protocol as needed for DTs                         - thiamine, Vitamin B12/Folate             - IM consult for medical management, uncontrolled HTN             - bowel regimen start sennakot daily and bisacodyl supp PRN                        - Additional recommendations may follow.       Please contact neurosurgery with any changes in patients neurologic status.        Electronically signed by Sheldon Moe NP on 10/27/2017 at 12:17 PM

## 2017-10-27 NOTE — PROGRESS NOTES
Writer assisted in transfer from bed <w/c. Writer requested pt to verbally review spinal prec- good return. Pt left off unit after pt donned another gown w/ min A.    Balance  Sitting Balance: Independent  Standing Balance: Contact guard assistance  Standing Balance  Sit to stand: Contact guard assistance  Stand to sit: Contact guard assistance  Bed mobility  Rolling to Left: Minimal assistance  Rolling to Right: Minimal assistance  Supine to Sit: Minimal assistance (required assist with upper trunk)  Sit to Supine: Minimal assistance (required assist with BLEs)  Comment: pt required max vc's for sequencing with poor carryover, moderate tactile cues needed for logroll technique  Transfers  Sit to stand: Contact guard assistance  Stand to sit: Contact guard assistance    Assessment   Assessment  Performance deficits / Impairments: Decreased functional mobility ; Decreased ADL status; Decreased strength;Decreased endurance;Decreased high-level IADLs  Prognosis: Good  Patient Education: transfer safety, spinal prec- good return  REQUIRES OT FOLLOW UP: Yes  Discharge Recommendations: Home with assist PRN;Home with Home health OT  Activity Tolerance  Activity Tolerance: Patient limited by pain  Safety Devices  Safety Devices in place: Not Applicable  Type of devices:  (pt left in w/c to PT gym)  Type of devices:  (pt left in w/c to PT gym)       Plan   Plan  Times per week: 4-5 x/wk  Current Treatment Recommendations: Strengthening, Balance Training, Functional Mobility Training, Endurance Training, Pain Management, Safety Education & Training, Self-Care / ADL, Positioning    G-Code  OT G-codes  Functional Assessment Tool Used: Barthel  Score: 13/20  Functional Limitation: Self care  Self Care Current Status (): At least 20 percent but less than 40 percent impaired, limited or restricted  Self Care Goal Status ():  At least 1 percent but less than 20 percent impaired, limited or restricted  OutComes Score Goals  Short term goals  Time Frame for Short term goals: Pt will, upon discharge  Short term goal 1: Demo static/dynamic standing tolerance for 10+ mins during ADL/IADL completion with SBA  Short term goal 2: Demo sup <>sit transfer to EOB with spinal precautions with SBA  Short term goal 3: Identify 2 non-pharmacological pain management techniques with <2 vcs  Short term goal 4: Perform sit <> stand transfer with SBA  Short term goal 5: Demo spinal precautions during ADL/IADL completion with <2vcs  Short term goal 6: Demo static/dynamic sitting tolerance for 20+ mins during ADL/IADL completion with supervision  Short term goal 7: Perform LB ADLs with Min A  Short term goal 8: Perform UB ADLs with SBA       Therapy Time   Individual Concurrent Group Co-treatment   Time In  1040         Time Out  [address], [place]/L

## 2017-10-27 NOTE — PROGRESS NOTES
Dr. Corea  notified patient current /74 and patient already received hydralazine 10 mg IV and pain medication, new orders received to give hydralazine 10 mg IV once

## 2017-10-28 VITALS
HEIGHT: 72 IN | BODY MASS INDEX: 30.48 KG/M2 | SYSTOLIC BLOOD PRESSURE: 122 MMHG | WEIGHT: 225 LBS | RESPIRATION RATE: 14 BRPM | OXYGEN SATURATION: 100 % | TEMPERATURE: 98.2 F | DIASTOLIC BLOOD PRESSURE: 63 MMHG | HEART RATE: 80 BPM

## 2017-10-28 PROCEDURE — 6370000000 HC RX 637 (ALT 250 FOR IP): Performed by: NEUROLOGICAL SURGERY

## 2017-10-28 PROCEDURE — 2580000003 HC RX 258: Performed by: EMERGENCY MEDICINE

## 2017-10-28 PROCEDURE — 6370000000 HC RX 637 (ALT 250 FOR IP): Performed by: PHYSICIAN ASSISTANT

## 2017-10-28 PROCEDURE — 2580000003 HC RX 258: Performed by: NEUROLOGICAL SURGERY

## 2017-10-28 PROCEDURE — 97116 GAIT TRAINING THERAPY: CPT

## 2017-10-28 PROCEDURE — 6370000000 HC RX 637 (ALT 250 FOR IP): Performed by: INTERNAL MEDICINE

## 2017-10-28 PROCEDURE — 99232 SBSQ HOSP IP/OBS MODERATE 35: CPT | Performed by: INTERNAL MEDICINE

## 2017-10-28 PROCEDURE — 97110 THERAPEUTIC EXERCISES: CPT

## 2017-10-28 RX ORDER — OXYCODONE HYDROCHLORIDE AND ACETAMINOPHEN 5; 325 MG/1; MG/1
1 TABLET ORAL EVERY 4 HOURS PRN
Qty: 60 TABLET | Refills: 0 | Status: SHIPPED | OUTPATIENT
Start: 2017-10-28 | End: 2017-11-04

## 2017-10-28 RX ORDER — PSEUDOEPHEDRINE HCL 30 MG
100 TABLET ORAL 2 TIMES DAILY PRN
Qty: 60 CAPSULE | Refills: 0 | Status: SHIPPED | OUTPATIENT
Start: 2017-10-28 | End: 2017-11-02 | Stop reason: ALTCHOICE

## 2017-10-28 RX ORDER — LISINOPRIL 5 MG/1
5 TABLET ORAL DAILY
Status: DISCONTINUED | OUTPATIENT
Start: 2017-10-28 | End: 2017-10-28 | Stop reason: HOSPADM

## 2017-10-28 RX ADMIN — VITAMIN B12 0.1 MG ORAL TABLET 50 MCG: 0.1 TABLET ORAL at 08:30

## 2017-10-28 RX ADMIN — LISINOPRIL 5 MG: 5 TABLET ORAL at 10:24

## 2017-10-28 RX ADMIN — OXYCODONE HYDROCHLORIDE AND ACETAMINOPHEN 1 TABLET: 5; 325 TABLET ORAL at 08:29

## 2017-10-28 RX ADMIN — FAMOTIDINE 20 MG: 20 TABLET, FILM COATED ORAL at 08:30

## 2017-10-28 RX ADMIN — OXYCODONE HYDROCHLORIDE AND ACETAMINOPHEN 1 TABLET: 5; 325 TABLET ORAL at 14:39

## 2017-10-28 RX ADMIN — CYCLOBENZAPRINE 10 MG: 10 TABLET, FILM COATED ORAL at 14:39

## 2017-10-28 RX ADMIN — CYCLOBENZAPRINE 10 MG: 10 TABLET, FILM COATED ORAL at 08:30

## 2017-10-28 RX ADMIN — Medication 100 MG: at 08:30

## 2017-10-28 RX ADMIN — Medication 10 ML: at 08:32

## 2017-10-28 RX ADMIN — THERA TABS 1 TABLET: TAB at 08:30

## 2017-10-28 RX ADMIN — DOCUSATE SODIUM 100 MG: 100 CAPSULE ORAL at 08:30

## 2017-10-28 RX ADMIN — FOLIC ACID 1 MG: 1 TABLET ORAL at 08:30

## 2017-10-28 RX ADMIN — OXYCODONE HYDROCHLORIDE AND ACETAMINOPHEN 1 TABLET: 5; 325 TABLET ORAL at 18:30

## 2017-10-28 ASSESSMENT — PAIN DESCRIPTION - PROGRESSION

## 2017-10-28 ASSESSMENT — ENCOUNTER SYMPTOMS
BLURRED VISION: 0
HEMOPTYSIS: 0
SPUTUM PRODUCTION: 0
HEARTBURN: 0
VOMITING: 0
WHEEZING: 0
NAUSEA: 0
ORTHOPNEA: 0
PHOTOPHOBIA: 0
COUGH: 0
ABDOMINAL PAIN: 0
DOUBLE VISION: 0
SHORTNESS OF BREATH: 0

## 2017-10-28 ASSESSMENT — PAIN DESCRIPTION - PAIN TYPE
TYPE: ACUTE PAIN
TYPE: ACUTE PAIN;SURGICAL PAIN

## 2017-10-28 ASSESSMENT — PAIN SCALES - GENERAL
PAINLEVEL_OUTOF10: 4
PAINLEVEL_OUTOF10: 3
PAINLEVEL_OUTOF10: 4
PAINLEVEL_OUTOF10: 3
PAINLEVEL_OUTOF10: 5
PAINLEVEL_OUTOF10: 4
PAINLEVEL_OUTOF10: 6
PAINLEVEL_OUTOF10: 3

## 2017-10-28 ASSESSMENT — PAIN DESCRIPTION - ONSET: ONSET: ON-GOING

## 2017-10-28 ASSESSMENT — PAIN DESCRIPTION - DESCRIPTORS: DESCRIPTORS: ACHING

## 2017-10-28 ASSESSMENT — PAIN DESCRIPTION - FREQUENCY: FREQUENCY: CONTINUOUS

## 2017-10-28 ASSESSMENT — PAIN DESCRIPTION - LOCATION
LOCATION: BACK
LOCATION: BACK;HEAD

## 2017-10-28 ASSESSMENT — PAIN DESCRIPTION - ORIENTATION: ORIENTATION: LOWER;MID;POSTERIOR

## 2017-10-28 NOTE — CARE COORDINATION
Spoke w/pt and spouse about home care. Pt has concerns about cost as they have a high deductible insurance. Pt decided that they could ask DIL who is a PT for assistance. Writer encouraged pt to contact PCP for orders if they decide to start physical therapy post DC. Aaron Melvin contact information given in AVS for free DME as pt concerned about out of pocket expense.

## 2017-10-28 NOTE — PROGRESS NOTES
Neurosurgery [facility]/Resident Note    Daily Progress Note     10/28/2017  3:56 PM    Chart reviewed. No new complaints. Sitting up in chair. Moving better today. Wants to take a shower. Pain well controlled. Had a BM. Vitals:    10/28/17 0429 10/28/17 0745 10/28/17 0815 10/28/17 1200   BP: (!) 160/74 (!) 146/77  134/71   Pulse: 78 77 79 80   Resp:  18  13   Temp: 97.7 °F (36.5 °C) 97.9 °F (36.6 °C)  97.2 °F (36.2 °C)   TempSrc: Oral Oral  Oral   SpO2:  97%  100%   Weight:       Height:           PE:   AOx3   CNII-XII intact   PERRL, EOMI   CVS: normal s2/s1  Resp: equal air entry bilaterally   Abd: soft, no rigidity  Motor and sensory intact all over    Incision c/d/i, no signs of infection to the lumbar incision. Lab Results   Component Value Date    WBC 7.5 10/25/2017    HGB 11.6 (L) 10/25/2017    HCT 34.0 (L) 10/25/2017     (L) 10/25/2017     10/25/2017    K 4.0 10/25/2017     10/25/2017    CREATININE 0.71 10/25/2017    BUN 9 10/25/2017    CO2 23 10/25/2017    INR 0.9 10/10/2017     A/P:  61 y.o. male who presents with low back pain w neurogenic claudication. MRI lumbar reveals severe canal stenosis at L2-5. POD #4 L2-5 decompressive laminectomy, medial facetectomy, foraminotomy                          - Neuro checks per protocol                        - No anticoagulants/antiplatelets/NSAIDs                        - SCDs for DVT ppx                        - General diet as tolerated                        - PT/OT: recommending home with home health PT.              - IM on. - bowel regimen start sennakot daily and bisacodyl supp PRN    - Shower today    - if doing well with PT possible DC home today, if not, plan for tomorrow. - Additional recommendations may follow. Please contact neurosurgery with any changes in patients neurologic status.        Electronically signed by Gregorio Fountain DO on 10/28/2017 at 3:56 PM

## 2017-10-28 NOTE — PROGRESS NOTES
for dizziness, tingling, tremors, focal weakness and headaches. PHYSICAL EXAM / OBJECTIVE DATA   BP (!) 160/74   Pulse 78   Temp 97.7 °F (36.5 °C) (Oral)   Resp 17   Ht 6' (1.829 m)   Wt 225 lb (102.1 kg)   SpO2 96%   BMI 30.52 kg/m²   Body mass index is 30.52 kg/m². Physical Exam   Constitutional: He is oriented to person, place, and time. He appears well-developed. HENT:   Head: Normocephalic and atraumatic. Eyes: Pupils are equal, round, and reactive to light. Cardiovascular: Normal rate and regular rhythm. Exam reveals no gallop and no friction rub. No murmur heard. Pulmonary/Chest: Effort normal. No respiratory distress. He has no wheezes. He has no rales. Abdominal: Soft. He exhibits no distension. There is no tenderness. There is no rebound. Musculoskeletal: Normal range of motion. He exhibits no edema. Neurological: He is alert and oriented to person, place, and time. No cranial nerve deficit. Coordination normal.          Vitals:    10/27/17 1510 10/27/17 2026 10/27/17 2345 10/28/17 0429   BP: (!) 182/80 (!) 170/76 (!) 174/74 (!) 160/74   Pulse: 101 94 94 78   Resp: 20 16 17    Temp: 99.3 °F (37.4 °C) 98.4 °F (36.9 °C) 98.6 °F (37 °C) 97.7 °F (36.5 °C)   TempSrc: Oral Oral Oral Oral   SpO2: 94% 96%     Weight:       Height:                                         LABORATORY TESTING        CBC:  Lab Results   Component Value Date    WBC 7.5 10/25/2017    HGB 11.6 10/25/2017     10/25/2017        BMP:    Lab Results   Component Value Date     10/25/2017    K 4.0 10/25/2017     10/25/2017    CO2 23 10/25/2017    BUN 9 10/25/2017    CREATININE 0.71 10/25/2017    GLUCOSE 127 10/25/2017      LIVER PROFILE:No results found for: ALT, AST, PROT, BILITOT, BILIDIR, LABALBU                                IMAGING / DIAGNOSTICS     Xr Chest Standard (2 Vw)    Result Date: 10/10/2017  EXAMINATION: TWO VIEWS OF THE CHEST 10/10/2017 12:34 pm COMPARISON: None.  HISTORY: ORDERING SYSTEM PROVIDED HISTORY: preop lumbar lami TECHNOLOGIST PROVIDED HISTORY: Reason for exam:->preop lumbar lami Acute. Initial evaluation. FINDINGS: The cardiac silhouette and mediastinal contours are normal.  The lungs are clear. No parenchymal lung infiltrate. No pleural effusion or pneumothorax. Degenerative changes are noted along the thoracic spine. 1. No acute cardiopulmonary disease. Xr Abdomen (kub) (single Ap View)    Result Date: 10/27/2017  EXAMINATION: SUPINE VIEW(S) OF THE ABDOMEN 10/27/2017 2:19 pm COMPARISON: None HISTORY: ORDERING SYSTEM PROVIDED HISTORY: abdominal distension, evaluate for obstruction TECHNOLOGIST PROVIDED HISTORY: Reason for exam:->abdominal distension, evaluate for obstruction FINDINGS: No lines or tubes. Gas is identified throughout the small bowel and colon without significant distention. No air-fluid levels. No free air or pneumatosis. No acute osseous abnormality. 1. Gas identified throughout the small bowel and colon without significant distention. No radiographic evidence of small bowel obstruction. Xr Lumbar Spine 1 Vw    Result Date: 10/24/2017  EXAMINATION: SINGLE VIEW OF THE LUMBAR SPINE 10/24/2017 9:23 am COMPARISON: Lumbar spine radiographs from 09/25/2017. HISTORY: ORDERING SYSTEM PROVIDED HISTORY: decompressive laminectomy l2-l5 TECHNOLOGIST PROVIDED HISTORY: Reason for exam:->decompressive laminectomy l2-l5 FINDINGS: 2 images obtained. Fluoro time: 7.8 seconds. DAP - 0.51 mGy. Intraoperative fluoroscopy demonstrates localization over the L5-S1 disc space with additional metallic instruments projecting over the posterior aspect of the lumbar spine. Fluoroscopic technique limits detail. A radiologist was not present for the procedure. For further findings and details, please refer to the operative report. Intraoperative fluoroscopy with localization over the L5-S1 disc space.                 ASSESSMENT AND PLAN     Active Problems:

## 2017-10-28 NOTE — PROGRESS NOTES
Physical Therapy  Facility/Department: [facility] NEURO  Daily Treatment Note  NAME: Tedra Schirmer  : 1960  MRN: [id number]    Date of Service: 10/28/2017    Patient Diagnosis(es):   Patient Active Problem List    Diagnosis Date Noted    S/P laminectomy 10/27/2017    Abdominal distension 10/27/2017    Constipation 10/27/2017    HTN (hypertension) 10/27/2017       Past Medical History:   Diagnosis Date    Lumbar disc disease     Snores      Past Surgical History:   Procedure Laterality Date    LAMINECTOMY N/A 10/24/2017    POSTERIOR DECOMPRESSION LAMINECTOMY, MEDIAL FACETECTOMY, FORAMINOTOMY L2-L5 (PRONE)  WANG TABLE, C-ARM, MICROSCOPE, EVOKES POTENTIAL (#[id number] [facility]) performed by Trina Dumont MD at [address] [place] [address] 10/24/2017    POSTERIOR DECOMPRESSION & MEDIAL FACETECTOMY, FORAMINOTOMY L2-L5 (PRONE)    ROTATOR CUFF REPAIR Right        Restrictions  Restrictions/Precautions  Restrictions/Precautions: General Precautions, Seizure, Fall Risk  Required Braces or Orthoses?: No  Position Activity Restriction  Spinal Precautions: No Bending, No Lifting, No Twisting  Other position/activity restrictions: activity as tolerated, ambulate pt  Subjective   General  Response To Previous Treatment: Patient with no complaints from previous session. Family / Caregiver Present: No  Subjective  Subjective: Pt arrives to therapy gym via aide and agreeable to therapy this morning. Pt reports back pain and headache this date. Pain Screening  Patient Currently in Pain: Yes  Pain Assessment  Pain Assessment: 0-10  Pain Level: 5  Pain Type: Acute pain  Pain Location: Back;Head  Pain Intervention(s): Repositioned; Ambulation/Increased activity; Distraction  Response to Pain Intervention: Patient Satisfied  Vital Signs  Patient Currently in Pain: Yes       Orientation  Orientation  Overall Orientation Status: Within Functional Limits  Objective      Transfers  Sit to Stand: Contact guard assistance  Stand to sit: Contact guard assistance  Ambulation  Ambulation?: Yes  Ambulation 1  Surface: level tile  Device: Rolling Walker  Assistance: Stand by assistance  Quality of Gait: decreased step length, decreased oswaldo, cautious gait  Distance: 120ft, 100ft  Comments: Pt reports not feeling well after second bout of ambulation and requested to return to room. Stairs/Curb  Stairs?: Yes  Stairs  # Steps : 4  Stairs Height: 6\"  Rails: Bilateral  Device: No Device  Assistance: Contact guard assistance     Balance  Posture: Fair  Sitting - Static: Good  Sitting - Dynamic: Good  Standing - Static: Fair  Standing - Dynamic: Fair  Comments: standing balance assessed without device           Standing exercise program: Heel/toe raises, hip flexion, hip abduction, mini squats, hip extension, and hamstring curls. Reps: 15    Upper extremity exercises: Bicep curl, shoulder flexion/extension, punches, tricep curl, shoulder abduction/adduction. Reps: 15 with two pound weights              Assessment   Body structures, Functions, Activity limitations: Decreased functional mobility ; Decreased balance;Decreased endurance;Decreased strength  Assessment: Pt with noted decreased endurance and overall fatigue with mobility. Pt to return home with home PT at discharge.   Prognosis: Good  Patient Education: Importance of mobility  Barriers to Learning: None  REQUIRES PT FOLLOW UP: Yes  Activity Tolerance  Activity Tolerance: Patient limited by fatigue;Patient limited by endurance  Activity Tolerance: Not feeling well       Discharge Recommendations:  Home with Home health PT      Goals  Short term goals  Time Frame for Short term goals: 14 visits  Short term goal 1: Patient will complete bed mobility taks with CGA  Short term goal 2: Patient will ambulate 100ft w/ CGA using RW  Short term goal 3: Patient will complete functional transfers w/ CGA   Short term goal 4: Patient will negotiate 3 steps w/ R handrail w/ Cristina  Short term goal 5: Patient will tolerate standing balance activites w/ good minus posture    Plan    Plan  Times per week: 6-7x/wk  Times per day: Daily  Specific instructions for Next Treatment: log rolling ; WATCH BP  Current Treatment Recommendations: Strengthening, ROM, Balance Training, Transfer Training, Gait Training, Stair training, Endurance Training, Neuromuscular Re-education, Functional Mobility Training, Safety Education & Training, Patient/Caregiver Education & Training  Safety Devices  Type of devices:  All fall risk precautions in place, Gait belt  Restraints  Initially in place: No     Therapy Time   Individual Concurrent Group Co-treatment   Time In 0858         Time Out 0928         Minutes 30         Timed Code Treatment Minutes: Lolita Dumont, PT

## 2017-11-02 ENCOUNTER — OFFICE VISIT (OUTPATIENT)
Dept: NEUROSURGERY | Age: 57
End: 2017-11-02

## 2017-11-02 VITALS — SYSTOLIC BLOOD PRESSURE: 136 MMHG | DIASTOLIC BLOOD PRESSURE: 78 MMHG | HEIGHT: 72 IN | HEART RATE: 71 BPM

## 2017-11-02 DIAGNOSIS — Z98.890 S/P LUMBAR LAMINECTOMY: Primary | ICD-10-CM

## 2017-11-02 PROCEDURE — 99024 POSTOP FOLLOW-UP VISIT: CPT | Performed by: NEUROLOGICAL SURGERY

## 2017-11-02 NOTE — PROGRESS NOTES
[facility] PHYSICIANS  [facility] NEUROSURGERY  [facility] [address]  [address] [address]  Dept: [phone]    Patient:  Mary Jane Cheng  YOB: 1960  Date: 11/2/17    The patient is a 61 y.o. male who presents today for follow up for post op visit. Date of Surgery: 10/24/17    Procedure: Decompressive laminectomy, medial facetectomy, and foraminotomy at L2-3, L3-4, L4-5.  Placement of subfascial Hemovac drain. Assessment:     Physical Examination:    /78   Pulse 71   Ht 6' (1.829 m)     General:  Mary Jane Cheng is a 61y.o. year old male who appears his stated age. HEENT: Normocephalic atraumatic. Neck supple. Neurologic Exam   Awake alert oriented. Speech is fluent and intact. Affect and visage are normal appropriate. Cranial nerves II through XII: Intact  Motor examination: Full strength symmetric throughout. Sensory examination: Intact perception of light touch. Reflex examination: Patella 2/4. Symmetric. Negative Alicia sign. Gait and posture: Upright posture. Normal narrow-based not spastic gait. Plan      1. S/P lumbar laminectomy          Plan: Mary Jane Cheng is a 58-year-old  male status post decompressive laminectomy, medial facetectomy, and foraminotomy from L2 to L5. He reports that he is able to ambulate without lower extremity pain at this time. Surgical wound appears to be healing up nicely. Surgical staples were removed during the clinic visit and Steri-Strips were placed. He seems quite pleased with the improvement in his neurological function. He will return to neurosurgical clinic in 2 months for routine postoperative check. Followup: Return in about 2 months (around 1/2/2018) for Postop. Prescriptions Ordered:  No orders of the defined types were placed in this encounter. Orders Placed:  No orders of the defined types were placed in this encounter.        Electronically signed by Loree Stewart MD on 11/2/2017 at 3:15 PM    Please note that this chart was generated using voice recognition Dragon dictation software. Although every effort was made to ensure the accuracy of this automated transcription, some errors in transcription may have occurred.

## 2017-11-02 NOTE — LETTER
[facility] PHYSICIANS  [facility] - [facility] NEUROSURGERY  [address] [facility] [address]  Dept: [phone]        11/2/17    Patient: Rhianna Montalvo  YOB: 1960    Dear Dr. Neal Butler MD,    I had the pleasure of seeing one of your patients, Earnest Garcia today in the office today. Below are the relevant portions of my assessment and plan of care. IMPRESSION:     1. S/P lumbar laminectomy      PLAN:   Plan: Rhianna Montalvo is a 59-year-old  male status post decompressive laminectomy, medial facetectomy, and foraminotomy from L2 to L5. He reports that he is able to ambulate without lower extremity pain at this time. Surgical wound appears to be healing up nicely. Surgical staples were removed during the clinic visit and Steri-Strips were placed. He seems quite pleased with the improvement in his neurological function. He will return to neurosurgical clinic in 2 months for routine postoperative check. Followup: Return in about 2 months (around 1/2/2018) for Postop. Prescriptions Ordered:  No orders of the defined types were placed in this encounter. Orders Placed:  No orders of the defined types were placed in this encounter. Thank you for allowing me to participate in the care of this patient. I will keep you updated on this patient's follow up and I look forward to serving you and your patients again in the future.       Yaya Llanos MD

## 2017-11-07 NOTE — DISCHARGE SUMMARY
Department of Neurosurgery                                            Discharge Summary       PATIENT NAME: Philipp Moser  YOB: 1960  MEDICAL RECORD NO. [id number]  DATE: 11/7/2017  PRIMARY CARE PHYSICIAN: Ludivina Lester MD  DISCHARGE DATE:  10/28/2017  8:00 PM  DISCHARGE DIAGNOSIS:   Patient Active Problem List   Diagnosis Code    S/P laminectomy Z98.890    Abdominal distension R14.0    Constipation K59.00    HTN (hypertension) I10       PROCEDURES:    L2-5 decompressive laminectomy, medial facetectomy, foraminotomy    [address] originally presented to the hospital on 10/24/2017  5:57 AM. with low back pain w neurogenic claudication. MRI lumbar reveals severe canal stenosis at L2-5. He was admitted and taken to the OR for neurosurgery for procedure listed above. Patient's drain was removed when found to have low output without complications. Patient tolerated all procedures well. Labs and imaging were followed daily. At time of discharge, Philipp Moser was having bowel movements,ambulating on He own accord and had adequate analgesia on oral pain medications, and had no signs of symptoms of complications. He is medically stable to be discharged. PHYSICAL EXAMINATION        Discharge Vitals:  height is 6' (1.829 m) and weight is 225 lb (102.1 kg). His oral temperature is 98.2 °F (36.8 °C). His blood pressure is 122/63 and his pulse is 80. His respiration is 14 and oxygen saturation is 100%. AOx3   CNII-XII intact   PERRL, EOMI   CVS: normal s2/s1  Respiratory: equal air entry bilaterally   Abdomen: soft, no rigidity  Motor and sensory intact all over     Incision c/d/i, no signs of infection to the lumbar incision. LABS     No results for input(s): WBC, HGB, HCT, PLT, NA, K, CL, CO2, BUN, CREATININE in the last 72 hours.     DISCHARGE INSTRUCTIONS     Discharge Medications:        Medication List      ASK your doctor about these medications

## 2018-01-18 ENCOUNTER — OFFICE VISIT (OUTPATIENT)
Dept: NEUROSURGERY | Age: 58
End: 2018-01-18

## 2018-01-18 VITALS — HEART RATE: 68 BPM | SYSTOLIC BLOOD PRESSURE: 146 MMHG | DIASTOLIC BLOOD PRESSURE: 86 MMHG

## 2018-01-18 DIAGNOSIS — Z98.890 S/P LUMBAR LAMINECTOMY: Primary | ICD-10-CM

## 2018-01-18 PROCEDURE — 99024 POSTOP FOLLOW-UP VISIT: CPT | Performed by: NEUROLOGICAL SURGERY

## 2018-01-18 NOTE — PROGRESS NOTES
[facility] PHYSICIANS  [facility]  [facility] [address]  [address] [address]  Dept: [phone]    Patient:  Cher Carey  YOB: 1960  Date: 1/18/18    The patient is a 61 y.o. male who presents today for follow up for post op visit. Date of Surgery: 10/24/17    Procedure: Decompressive laminectomy, medial facetectomy, and foraminotomy at L2-3, L3-4, L4-5.  Placement of subfascial Hemovac drain. Assessment:     Physical Examination:    BP (!) 146/86   Pulse 68     General:  Cher Carey is a 61y.o. year old male who appears his stated age. HEENT: Normocephalic atraumatic. Neck supple. Neurologic Exam   CN II-XII:  Intact  Motor examination:  Full strength, symmetric throughout. Sensory examination:  Intact perception of light touch. Reflex examination:  Patella 2/4. Negative Corral sign. Gait and posture:  Upright posture. Normal narrow-based nonspastic gait. Plan      1. S/P lumbar laminectomy          Plan: Cher Carey is a 58-year-old  male status post decompressive laminectomy, medial facetectomy, and foraminotomy from L2 to L5. The patient reports that he is feeling much better. He is no longer having lower extremity pain whenever he ambulates. Surgical wound is well-healed. He denies any new neurological symptoms. I counseled him that he may return to his regular activities. He may return to neurosurgery clinic on an as-needed basis. Followup: Return if symptoms worsen or fail to improve. Prescriptions Ordered:  No orders of the defined types were placed in this encounter. Orders Placed:  No orders of the defined types were placed in this encounter. Electronically signed by Sandra Waters MD on 1/18/2018 at 12:16 PM    Please note that this chart was generated using voice recognition Dragon dictation software.   Although every effort was made to ensure the accuracy of this automated transcription, some errors in transcription may have occurred.

## 2021-05-03 NOTE — LETTER
[facility] PHYSICIANS  [facility] - [facility] NEUROSURGERY  [address] [facility] [address]  Dept: [phone]        1/18/18    Patient: Susanna Bennett  YOB: 1960    Dear Dr. Leonette Rubinstein, MD,    I had the pleasure of seeing one of your patients, Dennis Reid today in the office today. Below are the relevant portions of my assessment and plan of care. IMPRESSION:     1. S/P lumbar laminectomy      PLAN:   Plan: Susanna Bennett is a 59-year-old  male status post decompressive laminectomy, medial facetectomy, and foraminotomy from L2 to L5. The patient reports that he is feeling much better. He is no longer having lower extremity pain whenever he ambulates. Surgical wound is well-healed. He denies any new neurological symptoms. I counseled him that he may return to his regular activities. He may return to neurosurgery clinic on an as-needed basis. Followup: Return if symptoms worsen or fail to improve. Prescriptions Ordered:  No orders of the defined types were placed in this encounter. Orders Placed:  No orders of the defined types were placed in this encounter. Thank you for allowing me to participate in the care of this patient. I will keep you updated on this patient's follow up and I look forward to serving you and your patients again in the future.       Rashida Odonnell MD Detail Level: Simple

## 2025-04-08 SDOH — HEALTH STABILITY: PHYSICAL HEALTH: ON AVERAGE, HOW MANY DAYS PER WEEK DO YOU ENGAGE IN MODERATE TO STRENUOUS EXERCISE (LIKE A BRISK WALK)?: 1 DAY

## 2025-04-08 SDOH — HEALTH STABILITY: PHYSICAL HEALTH: ON AVERAGE, HOW MANY MINUTES DO YOU ENGAGE IN EXERCISE AT THIS LEVEL?: 10 MIN

## 2025-04-09 ENCOUNTER — HOSPITAL ENCOUNTER (OUTPATIENT)
Age: 65
Setting detail: SPECIMEN
Discharge: HOME | End: 2025-04-09

## 2025-04-09 ENCOUNTER — OFFICE VISIT (OUTPATIENT)
Age: 65
End: 2025-04-09
Payer: MEDICARE

## 2025-04-09 VITALS
HEART RATE: 56 BPM | HEIGHT: 72 IN | RESPIRATION RATE: 18 BRPM | DIASTOLIC BLOOD PRESSURE: 66 MMHG | TEMPERATURE: 97.7 F | WEIGHT: 241 LBS | BODY MASS INDEX: 32.64 KG/M2 | SYSTOLIC BLOOD PRESSURE: 144 MMHG

## 2025-04-09 DIAGNOSIS — R73.03 PREDIABETES: ICD-10-CM

## 2025-04-09 DIAGNOSIS — Z11.4 SCREENING FOR HUMAN IMMUNODEFICIENCY VIRUS: ICD-10-CM

## 2025-04-09 DIAGNOSIS — Z11.59 NEED FOR HEPATITIS C SCREENING TEST: ICD-10-CM

## 2025-04-09 DIAGNOSIS — Z87.891 FORMER SMOKER: ICD-10-CM

## 2025-04-09 DIAGNOSIS — N52.9 ERECTILE DYSFUNCTION, UNSPECIFIED ERECTILE DYSFUNCTION TYPE: ICD-10-CM

## 2025-04-09 DIAGNOSIS — M1A.0710 CHRONIC IDIOPATHIC GOUT INVOLVING TOE OF RIGHT FOOT WITHOUT TOPHUS: ICD-10-CM

## 2025-04-09 DIAGNOSIS — I10 PRIMARY HYPERTENSION: Primary | ICD-10-CM

## 2025-04-09 DIAGNOSIS — Z12.5 PROSTATE CANCER SCREENING: ICD-10-CM

## 2025-04-09 DIAGNOSIS — I10 PRIMARY HYPERTENSION: ICD-10-CM

## 2025-04-09 LAB
ALBUMIN SERPL-MCNC: 4.7 G/DL (ref 3.5–5.2)
ALBUMIN/GLOB SERPL: 1.6 {RATIO} (ref 1–2.5)
ALP SERPL-CCNC: 65 U/L (ref 40–129)
ALT SERPL-CCNC: 60 U/L (ref 10–50)
ANION GAP SERPL CALCULATED.3IONS-SCNC: 11 MMOL/L (ref 9–16)
AST SERPL-CCNC: 46 U/L (ref 10–50)
BASOPHILS # BLD: 0.03 K/UL (ref 0–0.2)
BASOPHILS NFR BLD: 0 % (ref 0–2)
BILIRUB SERPL-MCNC: 1.1 MG/DL (ref 0–1.2)
BUN SERPL-MCNC: 17 MG/DL (ref 8–23)
CALCIUM SERPL-MCNC: 10.2 MG/DL (ref 8.6–10.4)
CHLORIDE SERPL-SCNC: 100 MMOL/L (ref 98–107)
CHOLEST SERPL-MCNC: 229 MG/DL (ref 0–199)
CHOLESTEROL/HDL RATIO: 3.9
CO2 SERPL-SCNC: 26 MMOL/L (ref 20–31)
CREAT SERPL-MCNC: 1 MG/DL (ref 0.7–1.2)
EOSINOPHIL # BLD: 0.09 K/UL (ref 0–0.44)
EOSINOPHILS RELATIVE PERCENT: 1 % (ref 1–4)
ERYTHROCYTE [DISTWIDTH] IN BLOOD BY AUTOMATED COUNT: 12.5 % (ref 11.8–14.4)
EST. AVERAGE GLUCOSE BLD GHB EST-MCNC: 103 MG/DL
GFR, ESTIMATED: 84 ML/MIN/1.73M2
GLUCOSE SERPL-MCNC: 103 MG/DL (ref 74–99)
HBA1C MFR BLD: 5.2 % (ref 4–6)
HCT VFR BLD AUTO: 51.4 % (ref 40.7–50.3)
HCV AB SERPL QL IA: NONREACTIVE
HDLC SERPL-MCNC: 59 MG/DL
HGB BLD-MCNC: 17.2 G/DL (ref 13–17)
HIV 1+2 AB+HIV1 P24 AG SERPL QL IA: NONREACTIVE
IMM GRANULOCYTES # BLD AUTO: <0.03 K/UL (ref 0–0.3)
IMM GRANULOCYTES NFR BLD: 0 %
LDLC SERPL CALC-MCNC: 138 MG/DL (ref 0–100)
LYMPHOCYTES NFR BLD: 1.66 K/UL (ref 1.1–3.7)
LYMPHOCYTES RELATIVE PERCENT: 20 % (ref 24–43)
MCH RBC QN AUTO: 30.8 PG (ref 25.2–33.5)
MCHC RBC AUTO-ENTMCNC: 33.5 G/DL (ref 28.4–34.8)
MCV RBC AUTO: 92.1 FL (ref 82.6–102.9)
MONOCYTES NFR BLD: 0.86 K/UL (ref 0.1–1.2)
MONOCYTES NFR BLD: 10 % (ref 3–12)
NEUTROPHILS NFR BLD: 69 % (ref 36–65)
NEUTS SEG NFR BLD: 5.58 K/UL (ref 1.5–8.1)
NRBC BLD-RTO: 0 PER 100 WBC
PLATELET # BLD AUTO: 179 K/UL (ref 138–453)
PMV BLD AUTO: 10.9 FL (ref 8.1–13.5)
POTASSIUM SERPL-SCNC: 4.3 MMOL/L (ref 3.7–5.3)
PROT SERPL-MCNC: 7.7 G/DL (ref 6.6–8.7)
PSA SERPL-MCNC: 0.91 NG/ML (ref 0–4)
RBC # BLD AUTO: 5.58 M/UL (ref 4.21–5.77)
SODIUM SERPL-SCNC: 137 MMOL/L (ref 136–145)
TRIGL SERPL-MCNC: 160 MG/DL
URATE SERPL-MCNC: 6.7 MG/DL (ref 3.4–7)
VLDLC SERPL CALC-MCNC: 32 MG/DL (ref 1–30)
WBC OTHER # BLD: 8.2 K/UL (ref 3.5–11.3)

## 2025-04-09 PROCEDURE — G8427 DOCREV CUR MEDS BY ELIG CLIN: HCPCS | Performed by: FAMILY MEDICINE

## 2025-04-09 PROCEDURE — 3078F DIAST BP <80 MM HG: CPT | Performed by: FAMILY MEDICINE

## 2025-04-09 PROCEDURE — G8417 CALC BMI ABV UP PARAM F/U: HCPCS | Performed by: FAMILY MEDICINE

## 2025-04-09 PROCEDURE — 99204 OFFICE O/P NEW MOD 45 MIN: CPT | Performed by: FAMILY MEDICINE

## 2025-04-09 PROCEDURE — 1123F ACP DISCUSS/DSCN MKR DOCD: CPT | Performed by: FAMILY MEDICINE

## 2025-04-09 PROCEDURE — 3017F COLORECTAL CA SCREEN DOC REV: CPT | Performed by: FAMILY MEDICINE

## 2025-04-09 PROCEDURE — 1036F TOBACCO NON-USER: CPT | Performed by: FAMILY MEDICINE

## 2025-04-09 PROCEDURE — 99202 OFFICE O/P NEW SF 15 MIN: CPT | Performed by: FAMILY MEDICINE

## 2025-04-09 PROCEDURE — 3077F SYST BP >= 140 MM HG: CPT | Performed by: FAMILY MEDICINE

## 2025-04-09 RX ORDER — LOSARTAN POTASSIUM AND HYDROCHLOROTHIAZIDE 25; 100 MG/1; MG/1
1 TABLET ORAL DAILY
Qty: 90 TABLET | Refills: 1 | Status: SHIPPED | OUTPATIENT
Start: 2025-04-09

## 2025-04-09 RX ORDER — ALLOPURINOL 100 MG/1
100 TABLET ORAL DAILY
Qty: 90 TABLET | Refills: 1 | Status: SHIPPED | OUTPATIENT
Start: 2025-04-09

## 2025-04-09 RX ORDER — LOSARTAN POTASSIUM AND HYDROCHLOROTHIAZIDE 25; 100 MG/1; MG/1
1 TABLET ORAL
COMMUNITY
End: 2025-04-09 | Stop reason: SDUPTHER

## 2025-04-09 RX ORDER — TADALAFIL 20 MG/1
20 TABLET ORAL PRN
Qty: 30 TABLET | Refills: 0 | Status: SHIPPED | OUTPATIENT
Start: 2025-04-09

## 2025-04-09 RX ORDER — TADALAFIL 20 MG/1
20 TABLET ORAL PRN
COMMUNITY
End: 2025-04-09 | Stop reason: SDUPTHER

## 2025-04-09 RX ORDER — ALLOPURINOL 100 MG/1
100 TABLET ORAL DAILY
COMMUNITY
End: 2025-04-09 | Stop reason: SDUPTHER

## 2025-04-09 SDOH — ECONOMIC STABILITY: FOOD INSECURITY: WITHIN THE PAST 12 MONTHS, THE FOOD YOU BOUGHT JUST DIDN'T LAST AND YOU DIDN'T HAVE MONEY TO GET MORE.: NEVER TRUE

## 2025-04-09 SDOH — ECONOMIC STABILITY: FOOD INSECURITY: WITHIN THE PAST 12 MONTHS, YOU WORRIED THAT YOUR FOOD WOULD RUN OUT BEFORE YOU GOT MONEY TO BUY MORE.: NEVER TRUE

## 2025-04-09 ASSESSMENT — PATIENT HEALTH QUESTIONNAIRE - PHQ9
1. LITTLE INTEREST OR PLEASURE IN DOING THINGS: NOT AT ALL
SUM OF ALL RESPONSES TO PHQ QUESTIONS 1-9: 0
SUM OF ALL RESPONSES TO PHQ QUESTIONS 1-9: 0
2. FEELING DOWN, DEPRESSED OR HOPELESS: NOT AT ALL
SUM OF ALL RESPONSES TO PHQ QUESTIONS 1-9: 0
SUM OF ALL RESPONSES TO PHQ QUESTIONS 1-9: 0

## 2025-04-09 NOTE — PATIENT INSTRUCTIONS
It was great meeting you today, Rangel!    I look forward to partnering with you on your health.    I am not making any medication changes today so we will keep taking your meds like you have been.    You are due for blood tests and can have these done today.  This will look at your blood sugars, cholesterol levels, kidney and liver, blood counts, uric acid (gout blood test), and will do your screening for HIV, hepatitis C, and prostate cancer.    Medicare will cover you doing the Cologuard colon cancer screening test and having the abdominal aortic aneurysm ultrasound if you would change your mind and either of these.  We can always discuss them more again in the future.    I do recommend seeing the eye doctor.  They will look at the back of your eyes to make sure high blood pressure is not affecting those blood vessels and they will also check for glaucoma or high pressure in the eye.  Both of these things can cause blindness and seeing the eye doctor yearly will help prevent this.

## 2025-04-09 NOTE — PROGRESS NOTES
[facility]  [facility]  [address] [place] [address]  Phone: [phone]  Fax: [phone]      Patient Name: Rangel James   Patient :  1960       ASSESSMENT/PLAN   1. Primary hypertension  -     Lipid Panel; Future  -     Comprehensive Metabolic Panel; Future  -     CBC with Auto Differential; Future  -     losartan-hydroCHLOROthiazide (HYZAAR) 100-25 MG per tablet; Take 1 tablet by mouth daily, Disp-90 tablet, R-1Normal  2. Chronic idiopathic gout involving toe of right foot without tophus  -     Uric Acid; Future  -     allopurinol (ZYLOPRIM) 100 MG tablet; Take 1 tablet by mouth daily, Disp-90 tablet, R-1Normal  3. Erectile dysfunction, unspecified erectile dysfunction type  -     tadalafil (CIALIS) 20 MG tablet; Take 1 tablet by mouth as needed for Erectile Dysfunction (Max of 1 tablet daily), Disp-30 tablet, R-0Normal  4. Prediabetes  -     Hemoglobin A1C; Future  5. Former smoker  6. Screening for human immunodeficiency virus  -     HIV Screen; Future  7. Need for hepatitis C screening test  -     Hepatitis C Antibody; Future  8. Prostate cancer screening  -     PSA Screening; Future     Patient Instructions   It was great meeting you today, Rangel!    I look forward to partnering with you on your health.    I am not making any medication changes today so we will keep taking your meds like you have been.    You are due for blood tests and can have these done today.  This will look at your blood sugars, cholesterol levels, kidney and liver, blood counts, uric acid (gout blood test), and will do your screening for HIV, hepatitis C, and prostate cancer.    Medicare will cover you doing the Cologuard colon cancer screening test and having the abdominal aortic aneurysm ultrasound if you would change your mind and either of these.  We can always discuss them more again in the future.    I do recommend seeing the eye doctor.  They

## 2025-04-11 ENCOUNTER — RESULTS FOLLOW-UP (OUTPATIENT)
Age: 65
End: 2025-04-11

## 2025-04-11 DIAGNOSIS — E78.2 MIXED HYPERLIPIDEMIA: Primary | ICD-10-CM

## 2025-04-11 DIAGNOSIS — D58.2 ELEVATED HEMOGLOBIN: ICD-10-CM

## 2025-04-11 DIAGNOSIS — R06.83 SNORING: ICD-10-CM

## 2025-04-11 PROBLEM — R73.03 PREDIABETES: Status: RESOLVED | Noted: 2025-04-09 | Resolved: 2025-04-11

## 2025-04-16 RX ORDER — ROSUVASTATIN CALCIUM 20 MG/1
20 TABLET, COATED ORAL NIGHTLY
Qty: 90 TABLET | Refills: 1 | Status: SHIPPED | OUTPATIENT
Start: 2025-04-16

## 2025-06-04 NOTE — PROGRESS NOTES
Medicare Service Recommended Frequency Last Done Due next   Pneumonia vaccine After 65, Prevnar 20 ONCE I do not have a record of you having a pneumonia vaccine. You are eligible for Prevnar 20 (pneumonia vaccine).   Influenza vaccine Yearly I do not have a record of you having a flu vaccine. You will be due for a flu vaccine in the fall.   Zoster/Shingles Shingrix vaccine:  2 shots 2-6 months apart  I do not have a record for you getting the shingles vaccine. You are eligible to get the shingles vaccine called Shingrix.  It is a two-step vaccine and can be completed at your local pharmacy.   COVID Variable I do not have a record for you getting the shingles vaccine. You are eligible to get the most recent shingles vaccine.  This can be done at your local pharmacy.   Tetanus vaccine (Td or Tdap) Every 10 years  I do not have a record for you getting the tetanus vaccination. You are eligible to get the tetanus vaccination.  I do recommend getting the version called Tdap.  This will protect you from both tetanus as well as whooping cough/pertussis.   RSV vaccine One injection I do not have a record of you getting the RSV vaccine. You are eligible to get the RSV vaccine.  You can get this completed at your local pharmacy.   Mammogram Every 1-2 years (ages ~40-75) N/A N/A   Colorectal Cancer Screening FIT test yearly, Cologuard every 3 years, Colonoscopy every 10 years *unless otherwise indicated* We do not have a record of you having a colonoscopy or other form of colon cancer screening. You are eligible to have colon cancer screening at this time.  The Cologuard is a test that is mailed to your home and can be completed at your convenience.  A colonoscopy can be scheduled if you would prefer that option.   Prostate Cancer Shared decision making with patient depending on family history and risk 4/9/2025 - normal PSA You are up-to-date on your prostate cancer screening.  You will be due to have your PSA checked again in

## 2025-06-05 ENCOUNTER — OFFICE VISIT (OUTPATIENT)
Age: 65
End: 2025-06-05
Payer: MEDICARE

## 2025-06-05 VITALS
HEART RATE: 55 BPM | DIASTOLIC BLOOD PRESSURE: 59 MMHG | RESPIRATION RATE: 16 BRPM | BODY MASS INDEX: 31.86 KG/M2 | WEIGHT: 235.2 LBS | HEIGHT: 72 IN | SYSTOLIC BLOOD PRESSURE: 139 MMHG | TEMPERATURE: 97.7 F

## 2025-06-05 DIAGNOSIS — Z00.00 WELCOME TO MEDICARE PREVENTIVE VISIT: Primary | ICD-10-CM

## 2025-06-05 DIAGNOSIS — Z91.89 AT RISK FOR HEART DISEASE: ICD-10-CM

## 2025-06-05 DIAGNOSIS — N52.9 ERECTILE DYSFUNCTION, UNSPECIFIED ERECTILE DYSFUNCTION TYPE: ICD-10-CM

## 2025-06-05 PROCEDURE — 1123F ACP DISCUSS/DSCN MKR DOCD: CPT | Performed by: FAMILY MEDICINE

## 2025-06-05 PROCEDURE — G0402 INITIAL PREVENTIVE EXAM: HCPCS | Performed by: FAMILY MEDICINE

## 2025-06-05 PROCEDURE — 3075F SYST BP GE 130 - 139MM HG: CPT | Performed by: FAMILY MEDICINE

## 2025-06-05 PROCEDURE — 3017F COLORECTAL CA SCREEN DOC REV: CPT | Performed by: FAMILY MEDICINE

## 2025-06-05 PROCEDURE — 3078F DIAST BP <80 MM HG: CPT | Performed by: FAMILY MEDICINE

## 2025-06-05 RX ORDER — TADALAFIL 20 MG/1
20 TABLET ORAL PRN
Qty: 30 TABLET | Refills: 0 | Status: SHIPPED | OUTPATIENT
Start: 2025-06-05

## 2025-06-05 SDOH — HEALTH STABILITY: PHYSICAL HEALTH: ON AVERAGE, HOW MANY MINUTES DO YOU ENGAGE IN EXERCISE AT THIS LEVEL?: 60 MIN

## 2025-06-05 SDOH — HEALTH STABILITY: PHYSICAL HEALTH: ON AVERAGE, HOW MANY DAYS PER WEEK DO YOU ENGAGE IN MODERATE TO STRENUOUS EXERCISE (LIKE A BRISK WALK)?: 7 DAYS

## 2025-06-05 ASSESSMENT — LIFESTYLE VARIABLES
HOW OFTEN DO YOU HAVE A DRINK CONTAINING ALCOHOL: 5
HOW OFTEN DURING THE LAST YEAR HAVE YOU NEEDED AN ALCOHOLIC DRINK FIRST THING IN THE MORNING TO GET YOURSELF GOING AFTER A NIGHT OF HEAVY DRINKING: NEVER
HAS A RELATIVE, FRIEND, DOCTOR, OR ANOTHER HEALTH PROFESSIONAL EXPRESSED CONCERN ABOUT YOUR DRINKING OR SUGGESTED YOU CUT DOWN: NO
HOW OFTEN DO YOU HAVE A DRINK CONTAINING ALCOHOL: 4 OR MORE TIMES A WEEK
HAS A RELATIVE, FRIEND, DOCTOR, OR ANOTHER HEALTH PROFESSIONAL EXPRESSED CONCERN ABOUT YOUR DRINKING OR SUGGESTED YOU CUT DOWN: NO
HOW OFTEN DURING THE LAST YEAR HAVE YOU FAILED TO DO WHAT WAS NORMALLY EXPECTED FROM YOU BECAUSE OF DRINKING: NEVER
HOW OFTEN DURING THE LAST YEAR HAVE YOU BEEN UNABLE TO REMEMBER WHAT HAPPENED THE NIGHT BEFORE BECAUSE YOU HAD BEEN DRINKING: NEVER
HOW OFTEN DURING THE LAST YEAR HAVE YOU FOUND THAT YOU WERE NOT ABLE TO STOP DRINKING ONCE YOU HAD STARTED: NEVER
HOW OFTEN DURING THE LAST YEAR HAVE YOU FOUND THAT YOU WERE NOT ABLE TO STOP DRINKING ONCE YOU HAD STARTED: NEVER
HOW OFTEN DURING THE LAST YEAR HAVE YOU HAD A FEELING OF GUILT OR REMORSE AFTER DRINKING: NEVER
HOW MANY STANDARD DRINKS CONTAINING ALCOHOL DO YOU HAVE ON A TYPICAL DAY: 3
HAVE YOU OR SOMEONE ELSE BEEN INJURED AS A RESULT OF YOUR DRINKING: NO
HAVE YOU OR SOMEONE ELSE BEEN INJURED AS A RESULT OF YOUR DRINKING: NO
HOW OFTEN DURING THE LAST YEAR HAVE YOU NEEDED AN ALCOHOLIC DRINK FIRST THING IN THE MORNING TO GET YOURSELF GOING AFTER A NIGHT OF HEAVY DRINKING: NEVER
HOW OFTEN DURING THE LAST YEAR HAVE YOU BEEN UNABLE TO REMEMBER WHAT HAPPENED THE NIGHT BEFORE BECAUSE YOU HAD BEEN DRINKING: NEVER
HOW MANY STANDARD DRINKS CONTAINING ALCOHOL DO YOU HAVE ON A TYPICAL DAY: 5 OR 6
HOW OFTEN DURING THE LAST YEAR HAVE YOU FAILED TO DO WHAT WAS NORMALLY EXPECTED FROM YOU BECAUSE OF DRINKING: NEVER
HOW OFTEN DURING THE LAST YEAR HAVE YOU HAD A FEELING OF GUILT OR REMORSE AFTER DRINKING: NEVER
HOW OFTEN DO YOU HAVE SIX OR MORE DRINKS ON ONE OCCASION: 4

## 2025-06-05 ASSESSMENT — PATIENT HEALTH QUESTIONNAIRE - PHQ9
SUM OF ALL RESPONSES TO PHQ QUESTIONS 1-9: 0
SUM OF ALL RESPONSES TO PHQ QUESTIONS 1-9: 0
2. FEELING DOWN, DEPRESSED OR HOPELESS: NOT AT ALL
SUM OF ALL RESPONSES TO PHQ QUESTIONS 1-9: 0
SUM OF ALL RESPONSES TO PHQ QUESTIONS 1-9: 0
1. LITTLE INTEREST OR PLEASURE IN DOING THINGS: NOT AT ALL

## 2025-06-05 NOTE — PATIENT INSTRUCTIONS
It was great to see you today, Rangel!    Below is a table that shows all of the different preventive health screenings that Medicare will cover for you in full.  You will be able to see the if you had it done before and when it was done and when you are due next.    As always, my job is just to give you the information so that you can make an educated decision about what is best for you and your healthcare.  If you do have any additional questions, please do not hesitate to reach out.    Remember that you can always use GoodRx for generic medication.  It is a way to get cheaper pills that may not be covered by your insurance.  Sometimes even medications that your insurance covers can be cheaper so it is a good thing to look at from time to time.         9 Ways to Cut Back on Drinking  Maybe you've found yourself drinking more alcohol than you'd prefer. If you want to cut back, here are some ideas to try.    Think before you drink.  Do you really want a drink, or is it just a habit? If you're used to having a drink at a certain time, try doing something else then.     Look for substitutes.  Find some no-alcohol drinks that you enjoy, like flavored seltzer water, tea with honey, or tonic with a slice of lime. Or try alcohol-free beer or \"virgin\" cocktails (without the alcohol).     Drink more water.  Use water to quench your thirst. Drink a glass of water before you have any alcohol. Have another glass along with every drink or between drinks.     Shrink your drink.  For example, have a bottle of beer instead of a pint. Use a smaller glass for wine. Choose drinks with lower alcohol content (ABV%). Or use less liquor and more mixer in cocktails.     Slow down.  It's easy to drink quickly and without thinking about it. Pay attention, and make each drink last longer.     Do the math.  Total up how much you spend on alcohol each month. How much is that a year? If you cut back, what could you do with the money you save?

## (undated) DEVICE — SHEET, ORTHO, SPLIT, STERILE: Brand: MEDLINE

## (undated) DEVICE — OIL CARTRIDGE: Brand: CORE, MAESTRO

## (undated) DEVICE — CODMAN® SURGICAL PATTIES 1/2" X 3" (1.27CM X 7.62CM): Brand: CODMAN®

## (undated) DEVICE — BLADE ES ELASTOMERIC COAT INSUL DURABLE BEND UPTO 90DEG

## (undated) DEVICE — DRAPE C ARM UNIV W41XL74IN CLR PLAS XR VELC CLSR POLY STRP

## (undated) DEVICE — DRAPE MICSCP W117XL305CM DIA65MM LENS W VARI LENS2 FOR LEICA

## (undated) DEVICE — GOWN,AURORA,NONREINFORCED,LARGE: Brand: MEDLINE

## (undated) DEVICE — DRAPE,REIN 53X77,STERILE: Brand: MEDLINE

## (undated) DEVICE — ELECTRODE PT RET AD L9FT HI MOIST COND ADH HYDRGEL CORDED

## (undated) DEVICE — NEEDLE SPNL L3.5IN PNK HUB S STL REG WALL FIT STYL W/ QNCKE

## (undated) DEVICE — PROTECTOR ULN NRV PUR FOAM HK LOOP STRP ANATOMICALLY

## (undated) DEVICE — SPONGE LAP W18XL18IN WHT COT 4 PLY FLD STRUNG RADPQ DISP ST

## (undated) DEVICE — COVER,MAYO STAND,STERILE: Brand: MEDLINE

## (undated) DEVICE — CODMAN® SURGICAL PATTIES 1/2" X 1/2" (1.27CM X 1.27CM): Brand: CODMAN®

## (undated) DEVICE — DRAPE IRRIG FLD WRM W44XL44IN W/ AORN STD PRTBL INTRATEMP

## (undated) DEVICE — TRAY CATHETER 16FR F INCLUDE BARDX IC COMPLT CARE DRNGE BG

## (undated) DEVICE — STERILE LATEX POWDER-FREE SURGICAL GLOVESWITH NITRILE COATING: Brand: PROTEXIS

## (undated) DEVICE — DIFFUSER: Brand: CORE, MAESTRO

## (undated) DEVICE — BLADE ES L4IN INSUL EDGE

## (undated) DEVICE — BIPOLAR SEALER 23-113-1 AQM 2.3 OM NEURO: Brand: AQUAMANTYS ®

## (undated) DEVICE — GAUZE,SPONGE,FLUFF,6"X6.75",STRL,5/TRAY: Brand: MEDLINE

## (undated) DEVICE — EMESIS BASIN: Brand: DEROYAL

## (undated) DEVICE — KIT EVAC 400CC DIA2.3MM PVC RELIABLE SUCT DRNGE 3 SPR RND H

## (undated) DEVICE — CODMAN® SURGICAL PATTIES 1" X 1" (2.54CM X 2.54CM): Brand: CODMAN®

## (undated) DEVICE — SOLUTION SURG PREP POV IOD 7.5% 4 OZ

## (undated) DEVICE — GLOVE ORANGE PI 8 1/2   MSG9085

## (undated) DEVICE — NEEDLE SPNL 22GA L3.5IN BLK HUB S STL REG WALL FIT STYL W/

## (undated) DEVICE — 3.0MM PRECISION NEURO (MATCH HEAD)

## (undated) DEVICE — SYRINGE, LUER LOCK, 10ML: Brand: MEDLINE

## (undated) DEVICE — C-ARMOR C-ARM EQUIPMENT COVERS CLEAR STERILE UNIVERSAL FIT 12 PER CASE: Brand: C-ARMOR

## (undated) DEVICE — 1000 S-DRAPE TOWEL DRAPE 10/BX: Brand: STERI-DRAPE™

## (undated) DEVICE — Device

## (undated) DEVICE — 3M™ IOBAN™ 2 ANTIMICROBIAL INCISE DRAPE 6650EZ: Brand: IOBAN™ 2

## (undated) DEVICE — PEN: MARKING STD 100/CS: Brand: MEDICAL ACTION INDUSTRIES

## (undated) DEVICE — SUTURE VCRL SZ 2-0 L18IN ABSRB VLT L26MM SH 1/2 CIR J775D

## (undated) DEVICE — SPONGE,NEURO,1"X3",XR,STRL,LF,10/PK: Brand: MEDLINE

## (undated) DEVICE — 3M™ STERI-STRIP™ COMPOUND BENZOIN TINCTURE 40 BAGS/CARTON 4 CARTONS/CASE C1544: Brand: 3M™ STERI-STRIP™

## (undated) DEVICE — 60 ML SYRINGE LUER-LOCK TIP: Brand: MONOJECT

## (undated) DEVICE — TOWEL,OR,DSP,ST,NATURAL,DLX,4/PK,20PK/CS: Brand: MEDLINE

## (undated) DEVICE — MEDI-VAC TUBING CONNECTOR 5-IN-1 STRAIGHT: Brand: CARDINAL HEALTH

## (undated) DEVICE — CONTROL SYRINGE LUER-LOCK TIP: Brand: MONOJECT

## (undated) DEVICE — PREP SOL PVP IODINE 4%  4 OZ/BTL

## (undated) DEVICE — PACK PROCEDURE SURG LUMBAR SPINE SVMMC

## (undated) DEVICE — CONNECTOR TBNG WHT PLAS SUCT STR 5IN1 LTWT W/ M CONN

## (undated) DEVICE — GAUZE,SPONGE,4"X4",16PLY,XRAY,STRL,LF: Brand: MEDLINE

## (undated) DEVICE — DRAPE SHEET, X-LARGE: Brand: CONVERTORS

## (undated) DEVICE — PAD,NON-ADHERENT,3X8,STERILE,LF,1/PK: Brand: MEDLINE